# Patient Record
Sex: FEMALE | Race: WHITE | NOT HISPANIC OR LATINO | Employment: PART TIME | URBAN - METROPOLITAN AREA
[De-identification: names, ages, dates, MRNs, and addresses within clinical notes are randomized per-mention and may not be internally consistent; named-entity substitution may affect disease eponyms.]

---

## 2023-08-28 ENCOUNTER — HOSPITAL ENCOUNTER (OUTPATIENT)
Dept: DATA CONVERSION | Facility: HOSPITAL | Age: 53
Discharge: HOME | End: 2023-08-28
Payer: COMMERCIAL

## 2023-08-28 DIAGNOSIS — R53.83 OTHER FATIGUE: ICD-10-CM

## 2023-08-28 DIAGNOSIS — Z00.00 ENCOUNTER FOR GENERAL ADULT MEDICAL EXAMINATION WITHOUT ABNORMAL FINDINGS: ICD-10-CM

## 2023-08-28 LAB
25(OH)D3 SERPL-MCNC: 40 NG/ML (ref 31–100)
ALBUMIN SERPL-MCNC: 4.5 GM/DL (ref 3.5–5)
ALBUMIN/GLOB SERPL: 1.5 RATIO (ref 1.5–3)
ALP BLD-CCNC: 106 U/L (ref 35–125)
ALT SERPL-CCNC: 21 U/L (ref 5–40)
ANION GAP SERPL CALCULATED.3IONS-SCNC: 13 MMOL/L (ref 0–19)
APPEARANCE PLAS: CLEAR
AST SERPL-CCNC: 24 U/L (ref 5–40)
BILIRUB SERPL-MCNC: 0.5 MG/DL (ref 0.1–1.2)
BUN SERPL-MCNC: 19 MG/DL (ref 8–25)
BUN/CREAT SERPL: 17.3 RATIO (ref 8–21)
CALCIUM SERPL-MCNC: 9.7 MG/DL (ref 8.5–10.4)
CHLORIDE SERPL-SCNC: 105 MMOL/L (ref 97–107)
CHOLEST SERPL-MCNC: 155 MG/DL (ref 133–200)
CHOLEST/HDLC SERPL: 3.3 RATIO
CO2 SERPL-SCNC: 23 MMOL/L (ref 24–31)
COLOR SPUN FLD: YELLOW
CREAT SERPL-MCNC: 1.1 MG/DL (ref 0.4–1.6)
DEPRECATED RDW RBC AUTO: 40.5 FL (ref 37–54)
ERYTHROCYTE [DISTWIDTH] IN BLOOD BY AUTOMATED COUNT: 12.3 % (ref 11.7–15)
FASTING STATUS PATIENT QL REPORTED: ABNORMAL
GFR SERPL CREATININE-BSD FRML MDRD: 59 ML/MIN/1.73 M2
GLOBULIN SER-MCNC: 3.1 G/DL (ref 1.9–3.7)
GLUCOSE SERPL-MCNC: 95 MG/DL (ref 65–99)
HCT VFR BLD AUTO: 41.9 % (ref 36–44)
HDLC SERPL-MCNC: 47 MG/DL
HGB BLD-MCNC: 13.7 GM/DL (ref 12–15)
IRON SATN MFR SERPL: 39 % (ref 12–50)
IRON SERPL-MCNC: 110 UG/DL (ref 30–160)
LDLC SERPL CALC-MCNC: 86 MG/DL (ref 65–130)
MCH RBC QN AUTO: 29.1 PG (ref 26–34)
MCHC RBC AUTO-ENTMCNC: 32.7 % (ref 31–37)
MCV RBC AUTO: 89 FL (ref 80–100)
NRBC BLD-RTO: 0 /100 WBC
PLATELET # BLD AUTO: 248 K/UL (ref 150–450)
PMV BLD AUTO: 11 CU (ref 7–12.6)
POTASSIUM SERPL-SCNC: 4.4 MMOL/L (ref 3.4–5.1)
PROT SERPL-MCNC: 7.6 G/DL (ref 5.9–7.9)
RBC # BLD AUTO: 4.71 M/UL (ref 4–4.9)
SODIUM SERPL-SCNC: 141 MMOL/L (ref 133–145)
TIBC SERPL-MCNC: 282 UG/DL (ref 228–428)
TRIGL SERPL-MCNC: 109 MG/DL (ref 40–150)
TSH SERPL DL<=0.05 MIU/L-ACNC: 1.75 MIU/L (ref 0.27–4.2)
VIT B12 SERPL-MCNC: 480 PG/ML (ref 211–946)
WBC # BLD AUTO: 7 K/UL (ref 4.5–11)

## 2023-09-15 ENCOUNTER — HOSPITAL ENCOUNTER (OUTPATIENT)
Dept: DATA CONVERSION | Facility: HOSPITAL | Age: 53
End: 2023-09-15

## 2023-09-15 VITALS
BODY MASS INDEX: 33.66 KG/M2 | WEIGHT: 202 LBS | HEART RATE: 80 BPM | DIASTOLIC BLOOD PRESSURE: 72 MMHG | SYSTOLIC BLOOD PRESSURE: 116 MMHG | OXYGEN SATURATION: 96 % | RESPIRATION RATE: 18 BRPM | HEIGHT: 65 IN | TEMPERATURE: 96.2 F

## 2023-09-15 DIAGNOSIS — R91.1 SOLITARY PULMONARY NODULE: ICD-10-CM

## 2023-09-15 DIAGNOSIS — I77.810 THORACIC AORTIC ECTASIA (CMS-HCC): ICD-10-CM

## 2023-11-13 ENCOUNTER — LAB (OUTPATIENT)
Dept: LAB | Facility: LAB | Age: 53
End: 2023-11-13
Payer: COMMERCIAL

## 2023-11-13 ENCOUNTER — TELEMEDICINE (OUTPATIENT)
Dept: PRIMARY CARE | Facility: CLINIC | Age: 53
End: 2023-11-13
Payer: COMMERCIAL

## 2023-11-13 DIAGNOSIS — R10.10 UPPER ABDOMINAL PAIN: ICD-10-CM

## 2023-11-13 DIAGNOSIS — R10.10 UPPER ABDOMINAL PAIN: Primary | ICD-10-CM

## 2023-11-13 DIAGNOSIS — K21.9 CHRONIC GERD: ICD-10-CM

## 2023-11-13 PROBLEM — L30.9 DERMATITIS, UNSPECIFIED: Status: ACTIVE | Noted: 2017-10-07

## 2023-11-13 PROBLEM — N76.1 CHRONIC VAGINITIS: Status: ACTIVE | Noted: 2023-11-13

## 2023-11-13 PROBLEM — L28.0 LICHEN SIMPLEX CHRONICUS: Status: ACTIVE | Noted: 2019-01-01

## 2023-11-13 PROBLEM — I77.810 ASCENDING AORTA DILATATION (CMS-HCC): Status: ACTIVE | Noted: 2023-11-13

## 2023-11-13 PROBLEM — F41.9 ANXIETY DISORDER: Status: ACTIVE | Noted: 2023-11-13

## 2023-11-13 PROBLEM — E78.5 HYPERLIPIDEMIA: Status: ACTIVE | Noted: 2018-08-31

## 2023-11-13 PROBLEM — L23.9 ALLERGIC ECZEMA: Status: ACTIVE | Noted: 2023-11-13

## 2023-11-13 LAB
ALBUMIN SERPL-MCNC: 4.7 G/DL (ref 3.5–5)
ALP BLD-CCNC: 101 U/L (ref 35–125)
ALT SERPL-CCNC: 28 U/L (ref 5–40)
ANION GAP SERPL CALC-SCNC: 12 MMOL/L
AST SERPL-CCNC: 28 U/L (ref 5–40)
BILIRUB SERPL-MCNC: 0.3 MG/DL (ref 0.1–1.2)
BUN SERPL-MCNC: 12 MG/DL (ref 8–25)
CALCIUM SERPL-MCNC: 9.6 MG/DL (ref 8.5–10.4)
CHLORIDE SERPL-SCNC: 102 MMOL/L (ref 97–107)
CO2 SERPL-SCNC: 27 MMOL/L (ref 24–31)
CREAT SERPL-MCNC: 1 MG/DL (ref 0.4–1.6)
ERYTHROCYTE [DISTWIDTH] IN BLOOD BY AUTOMATED COUNT: 12.5 % (ref 11.5–14.5)
GFR SERPL CREATININE-BSD FRML MDRD: 67 ML/MIN/1.73M*2
GLUCOSE SERPL-MCNC: 98 MG/DL (ref 65–99)
HCT VFR BLD AUTO: 42.5 % (ref 36–46)
HGB BLD-MCNC: 13.9 G/DL (ref 12–16)
LIPASE SERPL-CCNC: 32 U/L (ref 16–63)
MCH RBC QN AUTO: 29.1 PG (ref 26–34)
MCHC RBC AUTO-ENTMCNC: 32.7 G/DL (ref 32–36)
MCV RBC AUTO: 89 FL (ref 80–100)
NRBC BLD-RTO: 0 /100 WBCS (ref 0–0)
PLATELET # BLD AUTO: 246 X10*3/UL (ref 150–450)
POTASSIUM SERPL-SCNC: 4.2 MMOL/L (ref 3.4–5.1)
PROT SERPL-MCNC: 7.3 G/DL (ref 5.9–7.9)
RBC # BLD AUTO: 4.77 X10*6/UL (ref 4–5.2)
SODIUM SERPL-SCNC: 141 MMOL/L (ref 133–145)
WBC # BLD AUTO: 7.1 X10*3/UL (ref 4.4–11.3)

## 2023-11-13 PROCEDURE — 85027 COMPLETE CBC AUTOMATED: CPT

## 2023-11-13 PROCEDURE — 36415 COLL VENOUS BLD VENIPUNCTURE: CPT

## 2023-11-13 PROCEDURE — 99214 OFFICE O/P EST MOD 30 MIN: CPT | Performed by: FAMILY MEDICINE

## 2023-11-13 PROCEDURE — 80053 COMPREHEN METABOLIC PANEL: CPT

## 2023-11-13 PROCEDURE — 83690 ASSAY OF LIPASE: CPT

## 2023-11-13 RX ORDER — OMEPRAZOLE 40 MG/1
40 CAPSULE, DELAYED RELEASE ORAL
Qty: 30 CAPSULE | Refills: 11 | Status: SHIPPED | OUTPATIENT
Start: 2023-11-13 | End: 2024-04-01 | Stop reason: WASHOUT

## 2023-11-13 RX ORDER — DIAZEPAM 2 MG/1
TABLET ORAL
COMMUNITY
Start: 2022-08-22

## 2023-11-13 RX ORDER — ALBUTEROL SULFATE 90 UG/1
AEROSOL, METERED RESPIRATORY (INHALATION)
COMMUNITY

## 2023-11-13 ASSESSMENT — PAIN SCALES - GENERAL: PAINLEVEL: 7

## 2023-11-13 NOTE — PROGRESS NOTES
With patient's permission this is a telemedicine visit with video and audio.  Doris Zhao is a 53 y.o. female who calls in for No chief complaint on file..  Last few weeks having abdominal pain. Upper middle. Worse after eating. Sometimes radiates to left side. Not eating currently to reduce pain. Bowel movements are consistent, no constipation or diarrhea.  No blood in stool.  No alcohol use.  No NSAID use.  Last upper endoscopy and colonoscopy was in 2015.        Objective   There were no vitals taken for this visit.    (Any vitals are reported by patient.)    On video:     Appearance: Normal appearance. Non acute appearing. Walking around, pushing on own stomach.      Effort: Respiratory effort is normal. Speaking in full sentences. No respiratory distress.     Mood and Affect: Mood normal.         Thought Content: Thought content normal.         Judgment: Judgment normal.       Assessment/Plan   Diagnoses and all orders for this visit:  Upper abdominal pain  -     US right upper quadrant; Future  -     Referral to Gastroenterology; Future  -     Comprehensive metabolic panel; Future  -     Lipase; Future  -     CBC; Future  Chronic GERD  -     omeprazole (PriLOSEC) 40 mg DR capsule; Take 1 capsule (40 mg) by mouth once daily in the morning. Take before meals. Do not crush or chew.    Precautions for worsening signs and symptoms to seek emergent care given.

## 2023-11-15 ENCOUNTER — HOSPITAL ENCOUNTER (OUTPATIENT)
Dept: RADIOLOGY | Facility: CLINIC | Age: 53
Discharge: HOME | End: 2023-11-15
Payer: COMMERCIAL

## 2023-11-15 DIAGNOSIS — R10.10 UPPER ABDOMINAL PAIN: ICD-10-CM

## 2023-11-15 PROCEDURE — 76705 ECHO EXAM OF ABDOMEN: CPT

## 2023-11-15 PROCEDURE — 76705 ECHO EXAM OF ABDOMEN: CPT | Performed by: RADIOLOGY

## 2023-11-17 ENCOUNTER — TELEPHONE (OUTPATIENT)
Dept: PRIMARY CARE | Facility: CLINIC | Age: 53
End: 2023-11-17
Payer: COMMERCIAL

## 2023-11-20 NOTE — TELEPHONE ENCOUNTER
Pt states that she sees GI tomorrow and that she was in the ER. States that they found something . Please advise

## 2023-11-20 NOTE — TELEPHONE ENCOUNTER
Pt made aware via vm and instructed to give the office an call back if further assistance is needed.

## 2023-11-20 NOTE — TELEPHONE ENCOUNTER
US unclear if gallbladder is the problem causing her pain. She should see GI for further evaluation though

## 2023-11-30 ENCOUNTER — TELEMEDICINE (OUTPATIENT)
Dept: PRIMARY CARE | Facility: CLINIC | Age: 53
End: 2023-11-30
Payer: COMMERCIAL

## 2023-11-30 DIAGNOSIS — B34.9 VIRAL SYNDROME: Primary | ICD-10-CM

## 2023-11-30 PROCEDURE — 99213 OFFICE O/P EST LOW 20 MIN: CPT | Performed by: NURSE PRACTITIONER

## 2023-11-30 RX ORDER — SUCRALFATE 1 G/10ML
1 SUSPENSION ORAL
COMMUNITY
Start: 2023-11-18 | End: 2023-12-29 | Stop reason: WASHOUT

## 2023-11-30 ASSESSMENT — ENCOUNTER SYMPTOMS
HEMOPTYSIS: 0
WEIGHT LOSS: 0
SHORTNESS OF BREATH: 0
RHINORRHEA: 0
MYALGIAS: 0
FEVER: 0
HEADACHES: 0
HEARTBURN: 0
WHEEZING: 0
SORE THROAT: 0
CHILLS: 0
SWEATS: 0
COUGH: 1

## 2023-11-30 ASSESSMENT — PAIN SCALES - GENERAL: PAINLEVEL: 8

## 2023-11-30 ASSESSMENT — PATIENT HEALTH QUESTIONNAIRE - PHQ9
2. FEELING DOWN, DEPRESSED OR HOPELESS: NOT AT ALL
SUM OF ALL RESPONSES TO PHQ9 QUESTIONS 1 AND 2: 0
1. LITTLE INTEREST OR PLEASURE IN DOING THINGS: NOT AT ALL

## 2023-11-30 NOTE — PATIENT INSTRUCTIONS
Because of the length of your illness, I have determined you have a viral infection known as a common cold, flu, or COVID  You can use Advil cold and sinus, Tylenol cold and sinus, or Sudafed 12 hour (all must be purchased from the pharmacist)  If you have high blood pressure use Coricidin HBP  Vicks vapor rub  Humidifier at night  Saline nasal spray for stuffy nose  Cepacol spray or lozenges for sore throat  If you are still sick after ten days call for an antibiotic.

## 2023-11-30 NOTE — PROGRESS NOTES
With patient's permission this is a telemedicine visit with video and audio.  History Of Present Illness  Doris Zhao is a 53 y.o. female who calls in for Cough (DR VARGAS PT- covid test negative sx are cough + sore throat+ earache + eyes are crusted and red x 5 days-[phone]/Tried tylenol, cough drops).    Cough  This is a new problem. The current episode started in the past 7 days. The problem has been unchanged. The problem occurs every few minutes. The cough is Non-productive. Associated symptoms include ear pain. Pertinent negatives include no chest pain, chills, ear congestion, fever, headaches, heartburn, hemoptysis, myalgias, nasal congestion, postnasal drip, rash, rhinorrhea, sore throat, shortness of breath, sweats, weight loss or wheezing.   Earache   There is pain in both ears. This is a new problem. The current episode started in the past 7 days. The pain is at a severity of 8/10. Associated symptoms include coughing. Pertinent negatives include no headaches, rash, rhinorrhea or sore throat.       Past Medical History  She has a past medical history of Anxiety disorder, unspecified, Encounter for insertion of intrauterine contraceptive device (2017), Personal history of other diseases of the digestive system, Personal history of other diseases of the nervous system and sense organs, and Personal history of other diseases of the respiratory system.    Medications  Current Outpatient Medications   Medication Instructions    albuterol 90 mcg/actuation inhaler INHALE 2 PUFFS BY MOUTH EVERY 4 HOURS AS NEEDED FOR 30 DAYS.    diazePAM (Valium) 2 mg tablet 1 tablet as needed Orally Once a day PRN anxiety for 30 days    omeprazole (PRILOSEC) 40 mg, oral, Daily before breakfast, Do not crush or chew.    sucralfate (CARAFATE) 1 g, oral, Daily with evening meal, Before meal        Surgical History  She has a past surgical history that includes  section, classic (10/24/2016) and Sinus surgery  (10/24/2016).     Social History  She reports that she has never smoked. She has never used smokeless tobacco. No history on file for alcohol use and drug use.    Family History  No family history on file.     Allergies  Codeine, Balsam peru, Ethylene, Glyceryl-t, Other, Potassium dichromate, Ethylene oxide (gas), Guaifenesin, and Propylene glycol    On video:     Appearance: Normal appearance.      Effort: Respiratory effort is normal. Speaking in full sentences. No respiratory distress.     Mood and Affect: Mood normal.         Thought Content: Thought content normal.         Judgment: Judgment normal.      Last Recorded Vitals  There were no vitals taken for this visit.  (Vitals are taken by patient at home, if any reported)    Relevant Results      Assessment/Plan   There are no diagnoses linked to this encounter.        Elida Camarillo, APRN-CNP

## 2023-12-28 ENCOUNTER — TELEPHONE (OUTPATIENT)
Dept: PRIMARY CARE | Facility: CLINIC | Age: 53
End: 2023-12-28
Payer: COMMERCIAL

## 2023-12-29 ENCOUNTER — TELEMEDICINE (OUTPATIENT)
Dept: PRIMARY CARE | Facility: CLINIC | Age: 53
End: 2023-12-29
Payer: COMMERCIAL

## 2023-12-29 VITALS — HEART RATE: 101 BPM | OXYGEN SATURATION: 96 %

## 2023-12-29 DIAGNOSIS — J40 BRONCHITIS: Primary | ICD-10-CM

## 2023-12-29 DIAGNOSIS — B37.9 YEAST INFECTION: ICD-10-CM

## 2023-12-29 DIAGNOSIS — R91.1 LUNG NODULE: ICD-10-CM

## 2023-12-29 PROCEDURE — 99214 OFFICE O/P EST MOD 30 MIN: CPT | Performed by: FAMILY MEDICINE

## 2023-12-29 RX ORDER — AZITHROMYCIN 250 MG/1
TABLET, FILM COATED ORAL
Qty: 6 TABLET | Refills: 0 | Status: SHIPPED | OUTPATIENT
Start: 2023-12-29 | End: 2024-01-03

## 2023-12-29 RX ORDER — METHYLPREDNISOLONE 4 MG/1
TABLET ORAL
Qty: 21 TABLET | Refills: 0 | Status: SHIPPED | OUTPATIENT
Start: 2023-12-29 | End: 2024-01-05

## 2023-12-29 RX ORDER — FLUCONAZOLE 150 MG/1
150 TABLET ORAL ONCE
Qty: 1 TABLET | Refills: 0 | Status: SHIPPED | OUTPATIENT
Start: 2023-12-29 | End: 2023-12-29

## 2023-12-29 ASSESSMENT — PAIN SCALES - GENERAL: PAINLEVEL: 3

## 2023-12-29 ASSESSMENT — PATIENT HEALTH QUESTIONNAIRE - PHQ9
SUM OF ALL RESPONSES TO PHQ9 QUESTIONS 1 AND 2: 0
1. LITTLE INTEREST OR PLEASURE IN DOING THINGS: NOT AT ALL
2. FEELING DOWN, DEPRESSED OR HOPELESS: NOT AT ALL

## 2023-12-29 NOTE — ASSESSMENT & PLAN NOTE
- Patient subsequently contacted office after encounter claiming to have yeast infection concerns relating to antibiotic use to which Diflucan was sent to pharmacy

## 2023-12-29 NOTE — PROGRESS NOTES
Outpatient Visit Note    Chief Complaint   Patient presents with    Chest Pain     Dr Berry pt - Chest congestion. Has been to  twice in the last month. Pt states when up and moving makes it worse. Pt had COVID a few weeks ago. Took at home COVID test with neg results    Cough     X2 months. Productive cough.    Shortness of Breath    Headache     intermittent       With patient's permission, this is a Telemedicine visit with video and audio. The provider and patient participated in this telemedicine encounter.    HPI:  Doris Zhao is a 53 y.o. female who presents to the office via telemedicine encounter secondary to complaints of upper respiratory symptoms.    Patient is established with Dr. Berry having last been seen in the office via telemedicine encounter on 11/30/2023 by Kiki Camarillo secondary to complaints of cough with sore throat, ear pain and eye irritation for approximately 5-7 days.  Reported cough to be nonproductive with prominent bilateral ear discomfort rated as a 8/10.  At that time symptoms were believed to be viral in nature with patient recommended supportive care measures.      Today she reports persistent productive cough which has been present for approximately 2 months.  Did have COVID several weeks ago with recent home COVID taken which was negative.  Was seen at urgent care prior to COVID, at which time she was placed on azithromycin noticing resolution of our respiratory symptoms for several days prior to COVID infection.  At most recent urgent care visit, she was prescribed Augmentin but was unable to tolerate medication as she has difficulty swallowing pills.  Has had intermittent headaches and shortness of breath.     Per patient she states that she has been evaluated at urgent care 2 separate times over the course the last month.  In contacting office yesterday she reported that recent chest x-ray identified left lower lobe 4 mm nodule.  Chart review shows that patient had  previous chest CT completed approximately 1 year ago by Dr. Puga of pulmonary medicine which identified similar left lower lobe nodule.          Current Medications  Current Outpatient Medications   Medication Instructions    albuterol 90 mcg/actuation inhaler INHALE 2 PUFFS BY MOUTH EVERY 4 HOURS AS NEEDED FOR 30 DAYS.    azithromycin (Zithromax) 250 mg tablet Take 2 tablets (500 mg) by mouth once daily for 1 day, THEN 1 tablet (250 mg) once daily for 4 days. Take 2 tabs (500 mg) by mouth today, than 1 daily for 4 days..    diazePAM (Valium) 2 mg tablet 1 tablet as needed Orally Once a day PRN anxiety for 30 days    fluconazole (DIFLUCAN) 150 mg, oral, Once, May take second dose after 72 hours if symptoms persist    methylPREDNISolone (Medrol Dospak) 4 mg tablets Take as directed on package.    omeprazole (PRILOSEC) 40 mg, oral, Daily before breakfast, Do not crush or chew.        Allergies  Allergies   Allergen Reactions    Codeine GI Upset    Balsam [place] Itching    Ethylene Itching    Glyceryl-T Hives and Itching    Other Hives and Itching    Potassium Dichromate Hives and Itching    Ethylene Oxide (Gas) Rash    Guaifenesin Itching    Propylene Glycol Hives, Itching, Other and Rash        Past Medical History:   Diagnosis Date    Anxiety     Anxiety disorder, unspecified     Anxiety    Asthma     Encounter for insertion of intrauterine contraceptive device 2017    Encounter for insertion of mirena IUD    Personal history of other diseases of the digestive system     History of esophageal reflux    Personal history of other diseases of the nervous system and sense organs     History of migraine with aura    Personal history of other diseases of the respiratory system     History of asthma      Past Surgical History:   Procedure Laterality Date     SECTION, CLASSIC  10/24/2016     Section     SECTION, LOW TRANSVERSE      SINUS SURGERY  10/24/2016    Sinus Surgery     No family  history on file.  Social History     Tobacco Use    Smoking status: Never    Smokeless tobacco: Never   Vaping Use    Vaping Use: Never used   Substance Use Topics    Alcohol use: Not Currently    Drug use: Never     Tobacco Use: Low Risk  (12/29/2023)    Patient History     Smoking Tobacco Use: Never     Smokeless Tobacco Use: Never     Passive Exposure: Not on file        ROS  All pertinent positive symptoms are included in the history of present illness.  All other systems have been reviewed and are negative and noncontributory to this patient's current ailments.    VITAL SIGNS  Patient is unable to provide    PHYSICAL EXAM  GENERAL APPEARANCE:  Alert and oriented x 3, Pleasant and cooperative, No acute distress.   LUNGS:  No conversational dyspnea or cough during encounter.   PSYCH:  appropriate mood and affect, no difficulty with speech.   Telemedicine visit, no other exam component done.      Assessment/Plan   Problem List Items Addressed This Visit             ICD-10-CM    Lung nodule R91.1     - Recently identified lung nodule seems to be in consistent location of the previously identified lung nodule as noted on CT scan completed in December 2022 by Dr. Puga  -Following resolution of acute symptoms, would recommend patient have routine follow-up with established PCP/pulmonary specialist for monitoring of send nodule         Bronchitis - Primary J40     - Given your symptoms and duration of illness, we feel that you can benefit from antibiotic coverage at this time   - A prescription for azithromycin was sent to your pharmacy, please take this medication as prescribed  -We will additionally send Medrol Dosepak with hopes that this reduces respiratory tract irritation and opens up lungs  -Tessalon Perles offered though declined as patient has had poor response in the past  - Recommend supportive care with increased fluid intake to thin secretions, Tylenol as needed for pain or fever, and steamy  showers/saline nasal rinses to help clear the nasal passages   - You may consider tea with honey or a cinnamon stick as these have natural antiviral and antibiotic properties   - Call if symptoms worsen or do not improve with these treatments         Relevant Medications    azithromycin (Zithromax) 250 mg tablet    methylPREDNISolone (Medrol Dospak) 4 mg tablets    Yeast infection B37.9     - Patient subsequently contacted office after encounter claiming to have yeast infection concerns relating to antibiotic use to which Diflucan was sent to pharmacy         Relevant Medications    fluconazole (Diflucan) 150 mg tablet

## 2023-12-29 NOTE — ASSESSMENT & PLAN NOTE
- Given your symptoms and duration of illness, we feel that you can benefit from antibiotic coverage at this time   - A prescription for azithromycin was sent to your pharmacy, please take this medication as prescribed  -We will additionally send Medrol Dosepak with hopes that this reduces respiratory tract irritation and opens up lungs  -Tessalon Perles offered though declined as patient has had poor response in the past  - Recommend supportive care with increased fluid intake to thin secretions, Tylenol as needed for pain or fever, and steamy showers/saline nasal rinses to help clear the nasal passages   - You may consider tea with honey or a cinnamon stick as these have natural antiviral and antibiotic properties   - Call if symptoms worsen or do not improve with these treatments

## 2023-12-29 NOTE — PATIENT INSTRUCTIONS
Problem List Items Addressed This Visit             ICD-10-CM    Lung nodule R91.1     - Recently identified lung nodule seems to be in consistent location of the previously identified lung nodule as noted on CT scan completed in December 2022 by Dr. Puga  -Following resolution of acute symptoms, would recommend patient have routine follow-up with established PCP/pulmonary specialist for monitoring of send nodule         Bronchitis - Primary J40     - Given your symptoms and duration of illness, we feel that you can benefit from antibiotic coverage at this time   - A prescription for azithromycin was sent to your pharmacy, please take this medication as prescribed  -We will additionally send Medrol Dosepak with hopes that this reduces respiratory tract irritation and opens up lungs  -Tessalon Perles offered though declined as patient has had poor response in the past  - Recommend supportive care with increased fluid intake to thin secretions, Tylenol as needed for pain or fever, and steamy showers/saline nasal rinses to help clear the nasal passages   - You may consider tea with honey or a cinnamon stick as these have natural antiviral and antibiotic properties   - Call if symptoms worsen or do not improve with these treatments         Relevant Medications    azithromycin (Zithromax) 250 mg tablet    methylPREDNISolone (Medrol Dospak) 4 mg tablets

## 2023-12-29 NOTE — ASSESSMENT & PLAN NOTE
- Recently identified lung nodule seems to be in consistent location of the previously identified lung nodule as noted on CT scan completed in December 2022 by Dr. Puga  -Following resolution of acute symptoms, would recommend patient have routine follow-up with established PCP/pulmonary specialist for monitoring of send nodule

## 2024-01-12 ENCOUNTER — HOSPITAL ENCOUNTER (OUTPATIENT)
Dept: RADIOLOGY | Facility: HOSPITAL | Age: 54
Discharge: HOME | End: 2024-01-12
Payer: COMMERCIAL

## 2024-01-12 DIAGNOSIS — I77.810 THORACIC AORTIC ECTASIA (CMS-HCC): ICD-10-CM

## 2024-01-12 PROCEDURE — 2550000001 HC RX 255 CONTRASTS: Performed by: FAMILY MEDICINE

## 2024-01-12 PROCEDURE — 71260 CT THORAX DX C+: CPT

## 2024-01-12 RX ADMIN — IOHEXOL 75 ML: 350 INJECTION, SOLUTION INTRAVENOUS at 08:38

## 2024-03-08 DIAGNOSIS — F41.9 ANXIETY DISORDER, UNSPECIFIED: ICD-10-CM

## 2024-03-11 RX ORDER — DIAZEPAM 2 MG/1
TABLET ORAL
Qty: 30 TABLET | Refills: 2 | OUTPATIENT
Start: 2024-03-11

## 2024-03-14 NOTE — TELEPHONE ENCOUNTER
SPOKE WITH PT AT THIS TIME SHE IS NOT ABLE TO SCHEDULE APT. PT STATES HER REFILLS , AND SHE JUST WANTED A NEW SCRIPT FOR EMERGENCIES, SHE STILL HAS MEDICATION LEFT, ONLY TAKING PRN.

## 2024-03-25 ENCOUNTER — HOSPITAL ENCOUNTER (OUTPATIENT)
Dept: RADIOLOGY | Facility: HOSPITAL | Age: 54
Discharge: HOME | End: 2024-03-25
Payer: COMMERCIAL

## 2024-03-25 VITALS — BODY MASS INDEX: 31.32 KG/M2 | HEIGHT: 65 IN | WEIGHT: 188 LBS

## 2024-03-25 DIAGNOSIS — Z12.31 ENCOUNTER FOR SCREENING MAMMOGRAM FOR MALIGNANT NEOPLASM OF BREAST: ICD-10-CM

## 2024-03-25 PROCEDURE — 77063 BREAST TOMOSYNTHESIS BI: CPT | Performed by: RADIOLOGY

## 2024-03-25 PROCEDURE — 77067 SCR MAMMO BI INCL CAD: CPT | Performed by: RADIOLOGY

## 2024-03-25 PROCEDURE — 77067 SCR MAMMO BI INCL CAD: CPT

## 2024-04-01 ENCOUNTER — OFFICE VISIT (OUTPATIENT)
Dept: PRIMARY CARE | Facility: CLINIC | Age: 54
End: 2024-04-01
Payer: COMMERCIAL

## 2024-04-01 VITALS
WEIGHT: 190.6 LBS | SYSTOLIC BLOOD PRESSURE: 118 MMHG | OXYGEN SATURATION: 96 % | HEIGHT: 65 IN | HEART RATE: 90 BPM | BODY MASS INDEX: 31.75 KG/M2 | TEMPERATURE: 97.4 F | DIASTOLIC BLOOD PRESSURE: 76 MMHG | RESPIRATION RATE: 18 BRPM

## 2024-04-01 DIAGNOSIS — R10.9 LEFT SIDED ABDOMINAL PAIN: ICD-10-CM

## 2024-04-01 DIAGNOSIS — M54.9 MID BACK PAIN ON LEFT SIDE: Primary | ICD-10-CM

## 2024-04-01 PROCEDURE — 1036F TOBACCO NON-USER: CPT | Performed by: FAMILY MEDICINE

## 2024-04-01 PROCEDURE — 99214 OFFICE O/P EST MOD 30 MIN: CPT | Performed by: FAMILY MEDICINE

## 2024-04-01 RX ORDER — GABAPENTIN 100 MG/1
100 CAPSULE ORAL 3 TIMES DAILY PRN
Qty: 60 CAPSULE | Refills: 0 | Status: SHIPPED | OUTPATIENT
Start: 2024-04-01 | End: 2024-09-28

## 2024-04-01 RX ORDER — CYCLOBENZAPRINE HCL 5 MG
2.5 TABLET ORAL 3 TIMES DAILY PRN
Qty: 30 TABLET | Refills: 0 | Status: SHIPPED | OUTPATIENT
Start: 2024-04-01

## 2024-04-01 RX ORDER — SUCRALFATE 1 G/10ML
1 SUSPENSION ORAL 2 TIMES DAILY
COMMUNITY
Start: 2024-03-29

## 2024-04-01 ASSESSMENT — ENCOUNTER SYMPTOMS
DYSURIA: 0
NAUSEA: 1
FEVER: 0
ABDOMINAL PAIN: 1
BELCHING: 1
CONSTIPATION: 1

## 2024-04-01 ASSESSMENT — PAIN SCALES - GENERAL: PAINLEVEL: 7

## 2024-04-01 NOTE — PROGRESS NOTES
History Of Present Illness  Doris Zhao is a 53 y.o. female presenting for Pain  .    HPI Has had chronic abdominal pain, left sided, intermittent, crampy. Radiates underneath left rib to left mid back. Laying on left side makes her hurt, but not on right side. Heat makes it feel better. Food doesn't make it feel worse. Having a BM does not make pain feel better. Currently undergoing evaluation with GI, Dr. Mathews. Awaiting results from breath testing. Has appt in 2 weeks. Omeprazole caused her to have pain.  CT 2023 nonacute.         Past Medical History  Patient Active Problem List    Diagnosis Date Noted    Lung nodule 2023    Bronchitis 2023    Yeast infection 2023    Allergic eczema 2023    Anxiety disorder 2023    Ascending aorta dilatation (CMS/HCC) 2023    Chronic GERD 2023    Chronic vaginitis 2023    Lichen simplex chronicus 2019    Hyperlipidemia 2018    Dermatitis, unspecified 10/07/2017        Medications  Current Outpatient Medications on File Prior to Visit   Medication Sig    albuterol 90 mcg/actuation inhaler INHALE 2 PUFFS BY MOUTH EVERY 4 HOURS AS NEEDED FOR 30 DAYS.    diazePAM (Valium) 2 mg tablet 1 tablet as needed Orally Once a day PRN anxiety for 30 days    sucralfate (Carafate) 100 mg/mL suspension Take 10 mL (1 g) by mouth 2 times a day.    [DISCONTINUED] omeprazole (PriLOSEC) 40 mg DR capsule Take 1 capsule (40 mg) by mouth once daily in the morning. Take before meals. Do not crush or chew.     No current facility-administered medications on file prior to visit.        Surgical History  She has a past surgical history that includes  section, classic (10/24/2016); Sinus surgery (10/24/2016); and  section, low transverse.     Social History  She reports that she has never smoked. She has never used smokeless tobacco. She reports that she does not currently use alcohol. She reports that she does not use  "drugs.    Family History  Family History   Problem Relation Name Age of Onset    Breast cancer Cousin          Allergies  Codeine, Balsam peru, Ethylene, Glyceryl-t, Other, Potassium dichromate, Ethylene oxide (gas), Guaifenesin, and Propylene glycol    Immunizations    There is no immunization history on file for this patient.     ROS  Negative, except as discussed in HPI     Vitals  /76 (BP Location: Right arm, Patient Position: Sitting, BP Cuff Size: Adult)   Pulse 90   Temp 36.3 °C (97.4 °F)   Resp 18   Ht 1.651 m (5' 5\")   Wt 86.5 kg (190 lb 9.6 oz)   SpO2 96%   BMI 31.72 kg/m²      Physical Exam  Vitals and nursing note reviewed.   Constitutional:       Appearance: She is normal weight.   Abdominal:      Palpations: Abdomen is soft. There is no mass.      Tenderness: There is no abdominal tenderness. There is no right CVA tenderness, left CVA tenderness, guarding or rebound.   Musculoskeletal:      Thoracic back: Tenderness (left mid back) present. No swelling or bony tenderness.   Neurological:      Mental Status: She is alert.         Relevant Results  Lab Results   Component Value Date    WBC 7.1 11/13/2023    WBC 7.0 08/28/2023    HGB 13.9 11/13/2023    HGB 13.7 08/28/2023    HCT 42.5 11/13/2023    HCT 41.9 08/28/2023    MCV 89 11/13/2023    MCV 89.0 08/28/2023     11/13/2023     08/28/2023     Lab Results   Component Value Date     11/13/2023     08/28/2023    K 4.2 11/13/2023    K 4.4 08/28/2023     11/13/2023     08/28/2023    CO2 27 11/13/2023    CO2 23 (L) 08/28/2023    BUN 12 11/13/2023    BUN 19 08/28/2023    CREATININE 1.00 11/13/2023    CREATININE 1.1 08/28/2023    CALCIUM 9.6 11/13/2023    CALCIUM 9.7 08/28/2023    PROT 7.3 11/13/2023    PROT 7.6 08/28/2023    BILITOT 0.3 11/13/2023    BILITOT 0.5 08/28/2023    ALKPHOS 101 11/13/2023    ALKPHOS 106 08/28/2023    ALT 28 11/13/2023    ALT 21 08/28/2023    AST 28 11/13/2023    AST 24 08/28/2023    " "GLUCOSE 98 11/13/2023    GLUCOSE 95 08/28/2023     No results found for: \"HGBA1C\"  Lab Results   Component Value Date    TSH 1.75 08/28/2023      Lab Results   Component Value Date    CHOL 155 08/28/2023    TRIG 109 08/28/2023    HDL 47 (L) 08/28/2023           Assessment/Plan   Doris was seen today for pain.  Diagnoses and all orders for this visit:  Mid back pain on left side (Primary)  Comments:  trial tx for msk cause  Orders:  -     gabapentin (Neurontin) 100 mg capsule; Take 1 capsule (100 mg) by mouth 3 times a day as needed (pain).  -     cyclobenzaprine (Flexeril) 5 mg tablet; Take 0.5 tablets (2.5 mg) by mouth 3 times a day as needed for muscle spasms.  Left sided abdominal pain  Comments:  Follow up with GI as advised       Medications Discontinued During This Encounter   Medication Reason    omeprazole (PriLOSEC) 40 mg DR capsule Med List Cleanup        Counseling:   Medication education:   -Education:  The patient is counseled regarding potential side-effects of any and all new medications  -Understanding:  Patient expressed understanding of information discussed today  -Adherence:  No barriers to adherence identified    Final treatment plan is a result of shared decision making with patient.         Jay Jay Berry MD   "

## 2024-04-29 ENCOUNTER — TELEPHONE (OUTPATIENT)
Dept: PRIMARY CARE | Facility: CLINIC | Age: 54
End: 2024-04-29
Payer: COMMERCIAL

## 2024-04-29 NOTE — TELEPHONE ENCOUNTER
PT CALLING WITH CONCERNS ABOUT CONTINUED WEIGHT LOSS PT STATE SHE HAS LOST MORE WEIGHT SINCE HER PREVIOUS APPT AND WOULD LIKE TO DISCUSS THIS WITH HER OFFERED HER AN APPOINTMENT AND SHE DECLINED

## 2024-05-02 ENCOUNTER — TELEMEDICINE (OUTPATIENT)
Dept: PRIMARY CARE | Facility: CLINIC | Age: 54
End: 2024-05-02
Payer: COMMERCIAL

## 2024-05-02 DIAGNOSIS — G89.29 CHRONIC ABDOMINAL PAIN: Primary | ICD-10-CM

## 2024-05-02 DIAGNOSIS — R10.9 CHRONIC ABDOMINAL PAIN: Primary | ICD-10-CM

## 2024-05-02 PROCEDURE — 1036F TOBACCO NON-USER: CPT | Performed by: FAMILY MEDICINE

## 2024-05-02 PROCEDURE — 99213 OFFICE O/P EST LOW 20 MIN: CPT | Performed by: FAMILY MEDICINE

## 2024-05-02 ASSESSMENT — ENCOUNTER SYMPTOMS
BACK PAIN: 1
WEIGHT LOSS: 1

## 2024-05-02 ASSESSMENT — PAIN SCALES - GENERAL: PAINLEVEL: 7

## 2024-05-02 ASSESSMENT — PATIENT HEALTH QUESTIONNAIRE - PHQ9
SUM OF ALL RESPONSES TO PHQ9 QUESTIONS 1 AND 2: 0
2. FEELING DOWN, DEPRESSED OR HOPELESS: NOT AT ALL
1. LITTLE INTEREST OR PLEASURE IN DOING THINGS: NOT AT ALL

## 2024-05-02 ASSESSMENT — LIFESTYLE VARIABLES: HOW MANY STANDARD DRINKS CONTAINING ALCOHOL DO YOU HAVE ON A TYPICAL DAY: PATIENT DOES NOT DRINK

## 2024-05-02 NOTE — PROGRESS NOTES
With patient's permission this is a telemedicine visit with video and audio.    History Of Present Illness  Doris Zhao is a 54 y.o. female who calls in for Weight Loss (Discuss back/abd pain pt states she's had this pain for an while now and to discuss weight loss./Pt is unable to obtain vitals./).    She is calling about persistent LUQ pain. Currently seeing GI, last visits 4/3/24 and 4/12/24, then had an EGD that was negative per patient. Patient has a follow up appt today  with GI at 4:30pm.   Reviewed the consult notes from those dates. Patient saw the NP and was prescribed dicyclomine, which she did not try. Pt was under impression that the GI doctor she saw before told her not to take anything but Tylenol.    She's concerned the pain is due to her pancreas. Reviewed with patient ER workup that had normal lipase. She also had a CT A/P on 11/18/24 that was nonacute.    She has continued to lose weight since the pain started.         Past Medical History  She has a past medical history of Anxiety, Anxiety disorder, unspecified, Asthma ([facility]), Encounter for insertion of intrauterine contraceptive device (06/05/2017), Personal history of other diseases of the digestive system, Personal history of other diseases of the nervous system and sense organs, and Personal history of other diseases of the respiratory system.    Medications  Current Outpatient Medications on File Prior to Visit   Medication Sig Dispense Refill    albuterol 90 mcg/actuation inhaler INHALE 2 PUFFS BY MOUTH EVERY 4 HOURS AS NEEDED FOR 30 DAYS.      diazePAM (Valium) 2 mg tablet 1 tablet as needed Orally Once a day PRN anxiety for 30 days      sucralfate (Carafate) 100 mg/mL suspension Take 10 mL (1 g) by mouth 2 times a day.      cyclobenzaprine (Flexeril) 5 mg tablet Take 0.5 tablets (2.5 mg) by mouth 3 times a day as needed for muscle spasms. (Patient not taking: Reported on 5/2/2024) 30 tablet 0    gabapentin (Neurontin) 100 mg  capsule Take 1 capsule (100 mg) by mouth 3 times a day as needed (pain). (Patient not taking: Reported on 2024) 60 capsule 0     No current facility-administered medications on file prior to visit.        Surgical History  She has a past surgical history that includes  section, classic (10/24/2016); Sinus surgery (10/24/2016); and  section, low transverse.     Social History  She reports that she has never smoked. She has never used smokeless tobacco. She reports that she does not currently use alcohol. She reports that she does not use drugs.    Family History  Family History   Problem Relation Name Age of Onset    Breast cancer Cousin          Allergies  Codeine, Balsam peru, Ethylene, Glyceryl-t, Other, Potassium dichromate, Ethylene oxide (gas), Guaifenesin, and Propylene glycol    On video:     Appearance: Normal appearance.      Effort: Respiratory effort is normal. Speaking in full sentences. No respiratory distress.     Mood and Affect: Mood normal.         Thought Content: Thought content normal.         Judgment: Judgment normal.      Last Recorded Vitals  There were no vitals taken for this visit.  (Vitals are taken by patient at home, if any reported)    Relevant Results      Assessment/Plan   Doris was seen today for weight loss.  Diagnoses and all orders for this visit:  Chronic abdominal pain (Primary)  Comments:  refractory pain with weight loss, undergoing GI workup. Advise to follow up today as scheduled. ER precautions given          There are no discontinued medications.     Jay Jay Berry MD

## 2024-06-20 ENCOUNTER — TELEMEDICINE (OUTPATIENT)
Dept: PRIMARY CARE | Facility: CLINIC | Age: 54
End: 2024-06-20
Payer: COMMERCIAL

## 2024-06-20 DIAGNOSIS — R10.9 ABDOMINAL PAIN, UNSPECIFIED ABDOMINAL LOCATION: ICD-10-CM

## 2024-06-20 DIAGNOSIS — R63.4 UNINTENTIONAL WEIGHT LOSS: Primary | ICD-10-CM

## 2024-06-20 PROCEDURE — 99214 OFFICE O/P EST MOD 30 MIN: CPT | Performed by: FAMILY MEDICINE

## 2024-06-20 RX ORDER — LANSOPRAZOLE 30 MG/1
30 CAPSULE, DELAYED RELEASE ORAL DAILY
COMMUNITY
Start: 2024-05-21

## 2024-06-20 RX ORDER — TRIAMCINOLONE ACETONIDE 1 MG/G
CREAM TOPICAL 2 TIMES DAILY
Qty: 30 G | Refills: 0 | Status: SHIPPED | OUTPATIENT
Start: 2024-06-20 | End: 2024-06-20 | Stop reason: ENTERED-IN-ERROR

## 2024-06-20 ASSESSMENT — LIFESTYLE VARIABLES: HOW MANY STANDARD DRINKS CONTAINING ALCOHOL DO YOU HAVE ON A TYPICAL DAY: PATIENT DOES NOT DRINK

## 2024-06-20 ASSESSMENT — PAIN SCALES - GENERAL: PAINLEVEL: 6

## 2024-06-20 ASSESSMENT — PATIENT HEALTH QUESTIONNAIRE - PHQ9
1. LITTLE INTEREST OR PLEASURE IN DOING THINGS: NOT AT ALL
2. FEELING DOWN, DEPRESSED OR HOPELESS: NOT AT ALL
SUM OF ALL RESPONSES TO PHQ9 QUESTIONS 1 AND 2: 0

## 2024-06-20 NOTE — PROGRESS NOTES
With patient's permission this is a telemedicine visit with video and audio.    History Of Present Illness  Doris Zhao is a 54 y.o. female who calls in for Weight Loss (Weight loss follow up/Pt is unable to obtain vitals at this time.).    HPI  Called complaining of a persistent abdominal pain that is been ongoing for months.  She had an unremarkable right upper quadrant ultrasound on 11/15/2023.  She went to the emergency room shortly after that and had an unremarkable CT of the abdomen pelvis on 2023, notable for hepatic steatosis.  Constant stabbing pain in stomach around back  Weight at home is 170 lbs this morning at home.   She has seen GI for evaluations, had an EGD on 2024    Also requesting cream for skin      Past Medical History  She has a past medical history of Anxiety, Anxiety disorder, unspecified, Asthma ([facility]), Encounter for insertion of intrauterine contraceptive device (2017), Personal history of other diseases of the digestive system, Personal history of other diseases of the nervous system and sense organs, and Personal history of other diseases of the respiratory system.    Medications  Current Outpatient Medications on File Prior to Visit   Medication Sig Dispense Refill    albuterol 90 mcg/actuation inhaler INHALE 2 PUFFS BY MOUTH EVERY 4 HOURS AS NEEDED FOR 30 DAYS.      cyclobenzaprine (Flexeril) 5 mg tablet Take 0.5 tablets (2.5 mg) by mouth 3 times a day as needed for muscle spasms. 30 tablet 0    diazePAM (Valium) 2 mg tablet 1 tablet as needed Orally Once a day PRN anxiety for 30 days      lansoprazole (Prevacid) 30 mg DR capsule Take 1 capsule (30 mg) by mouth once daily.      sucralfate (Carafate) 100 mg/mL suspension Take 10 mL (1 g) by mouth 2 times a day.       No current facility-administered medications on file prior to visit.        Surgical History  She has a past surgical history that includes  section, classic (10/24/2016); Sinus surgery  (10/24/2016); and  section, low transverse.     Social History  She reports that she has never smoked. She has never used smokeless tobacco. She reports that she does not currently use alcohol. She reports that she does not use drugs.    Family History  Family History   Problem Relation Name Age of Onset    Breast cancer Cousin          Allergies  Codeine, Balsam peru, Ethylene, Glyceryl-t, Other, Potassium dichromate, Ethylene oxide (gas), Guaifenesin, and Propylene glycol    On video:     Appearance: Normal appearance.      Effort: Respiratory effort is normal. Speaking in full sentences. No respiratory distress.     Mood and Affect: Mood normal.         Thought Content: Thought content normal.         Judgment: Judgment normal.      Last Recorded Vitals  There were no vitals taken for this visit.  (Vitals are taken by patient at home, if any reported)    Relevant Results      Assessment/Plan   Doris was seen today for weight loss.  Diagnoses and all orders for this visit:  Unintentional weight loss (Primary)  -     Comprehensive Metabolic Panel; Future  -     TSH with reflex to Free T4 if abnormal; Future  -     CBC and Auto Differential; Future  -     Lipase; Future  -     CT abdomen pelvis w and wo IV contrast; Future  Abdominal pain, unspecified abdominal location  -     CT abdomen pelvis w and wo IV contrast; Future  Other orders  -     Discontinue: triamcinolone (Kenalog) 0.1 % cream; Apply topically 2 times a day. Apply to affected area 1-2 times daily as needed.          Medications Discontinued During This Encounter   Medication Reason    gabapentin (Neurontin) 100 mg capsule Med List Cleanup    triamcinolone (Kenalog) 0.1 % cream Entered in Error        Jay Jay Berry MD

## 2024-06-25 ENCOUNTER — LAB (OUTPATIENT)
Dept: LAB | Facility: LAB | Age: 54
End: 2024-06-25
Payer: COMMERCIAL

## 2024-06-25 DIAGNOSIS — R63.4 UNINTENTIONAL WEIGHT LOSS: ICD-10-CM

## 2024-06-25 LAB
ALBUMIN SERPL-MCNC: 4.7 G/DL (ref 3.5–5)
ALP BLD-CCNC: 100 U/L (ref 35–125)
ALT SERPL-CCNC: 28 U/L (ref 5–40)
ANION GAP SERPL CALC-SCNC: 12 MMOL/L
AST SERPL-CCNC: 27 U/L (ref 5–40)
BASOPHILS # BLD AUTO: 0.08 X10*3/UL (ref 0–0.1)
BASOPHILS NFR BLD AUTO: 1.2 %
BILIRUB SERPL-MCNC: 0.5 MG/DL (ref 0.1–1.2)
BUN SERPL-MCNC: 17 MG/DL (ref 8–25)
CALCIUM SERPL-MCNC: 9.8 MG/DL (ref 8.5–10.4)
CHLORIDE SERPL-SCNC: 101 MMOL/L (ref 97–107)
CO2 SERPL-SCNC: 27 MMOL/L (ref 24–31)
CREAT SERPL-MCNC: 1.1 MG/DL (ref 0.4–1.6)
EGFRCR SERPLBLD CKD-EPI 2021: 59 ML/MIN/1.73M*2
EOSINOPHIL # BLD AUTO: 0.09 X10*3/UL (ref 0–0.7)
EOSINOPHIL NFR BLD AUTO: 1.3 %
ERYTHROCYTE [DISTWIDTH] IN BLOOD BY AUTOMATED COUNT: 12.7 % (ref 11.5–14.5)
GLUCOSE SERPL-MCNC: 82 MG/DL (ref 65–99)
HCT VFR BLD AUTO: 42.4 % (ref 36–46)
HGB BLD-MCNC: 13.8 G/DL (ref 12–16)
IMM GRANULOCYTES # BLD AUTO: 0.01 X10*3/UL (ref 0–0.7)
IMM GRANULOCYTES NFR BLD AUTO: 0.1 % (ref 0–0.9)
LIPASE SERPL-CCNC: 37 U/L (ref 16–63)
LYMPHOCYTES # BLD AUTO: 2.65 X10*3/UL (ref 1.2–4.8)
LYMPHOCYTES NFR BLD AUTO: 38.3 %
MCH RBC QN AUTO: 29.1 PG (ref 26–34)
MCHC RBC AUTO-ENTMCNC: 32.5 G/DL (ref 32–36)
MCV RBC AUTO: 90 FL (ref 80–100)
MONOCYTES # BLD AUTO: 0.44 X10*3/UL (ref 0.1–1)
MONOCYTES NFR BLD AUTO: 6.4 %
NEUTROPHILS # BLD AUTO: 3.65 X10*3/UL (ref 1.2–7.7)
NEUTROPHILS NFR BLD AUTO: 52.7 %
NRBC BLD-RTO: 0 /100 WBCS (ref 0–0)
PLATELET # BLD AUTO: 232 X10*3/UL (ref 150–450)
POTASSIUM SERPL-SCNC: 4.6 MMOL/L (ref 3.4–5.1)
PROT SERPL-MCNC: 7.4 G/DL (ref 5.9–7.9)
RBC # BLD AUTO: 4.74 X10*6/UL (ref 4–5.2)
SODIUM SERPL-SCNC: 140 MMOL/L (ref 133–145)
TSH SERPL DL<=0.05 MIU/L-ACNC: 1.33 MIU/L (ref 0.27–4.2)
WBC # BLD AUTO: 6.9 X10*3/UL (ref 4.4–11.3)

## 2024-06-25 PROCEDURE — 84443 ASSAY THYROID STIM HORMONE: CPT

## 2024-06-25 PROCEDURE — 80053 COMPREHEN METABOLIC PANEL: CPT

## 2024-06-25 PROCEDURE — 36415 COLL VENOUS BLD VENIPUNCTURE: CPT

## 2024-06-25 PROCEDURE — 83690 ASSAY OF LIPASE: CPT

## 2024-06-25 PROCEDURE — 85025 COMPLETE CBC W/AUTO DIFF WBC: CPT

## 2024-06-26 ENCOUNTER — APPOINTMENT (OUTPATIENT)
Dept: PRIMARY CARE | Facility: CLINIC | Age: 54
End: 2024-06-26
Payer: COMMERCIAL

## 2024-07-08 ENCOUNTER — APPOINTMENT (OUTPATIENT)
Dept: RADIOLOGY | Facility: HOSPITAL | Age: 54
End: 2024-07-08
Payer: COMMERCIAL

## 2024-07-22 ENCOUNTER — APPOINTMENT (OUTPATIENT)
Dept: RADIOLOGY | Facility: HOSPITAL | Age: 54
End: 2024-07-22
Payer: COMMERCIAL

## 2024-08-19 ENCOUNTER — HOSPITAL ENCOUNTER (OUTPATIENT)
Dept: RADIOLOGY | Facility: HOSPITAL | Age: 54
Discharge: HOME | End: 2024-08-19
Payer: COMMERCIAL

## 2024-08-19 DIAGNOSIS — R63.4 UNINTENTIONAL WEIGHT LOSS: ICD-10-CM

## 2024-08-19 DIAGNOSIS — R10.9 ABDOMINAL PAIN, UNSPECIFIED ABDOMINAL LOCATION: ICD-10-CM

## 2024-08-19 PROCEDURE — 2550000001 HC RX 255 CONTRASTS: Performed by: FAMILY MEDICINE

## 2024-08-19 PROCEDURE — 74177 CT ABD & PELVIS W/CONTRAST: CPT | Performed by: RADIOLOGY

## 2024-08-19 PROCEDURE — 74177 CT ABD & PELVIS W/CONTRAST: CPT

## 2024-11-11 ENCOUNTER — TELEPHONE (OUTPATIENT)
Dept: PRIMARY CARE | Facility: CLINIC | Age: 54
End: 2024-11-11
Payer: COMMERCIAL

## 2024-11-11 DIAGNOSIS — F41.9 ANXIETY DISORDER, UNSPECIFIED TYPE: ICD-10-CM

## 2024-11-11 DIAGNOSIS — J40 BRONCHITIS: Primary | ICD-10-CM

## 2024-11-11 NOTE — TELEPHONE ENCOUNTER
PT CAME IN THINKING SHE HAS AN APT, BUT HER APT IS NOT UNTIL JANUARY. PT ASKED FOR THIS MSG TO BE SENT TO PROVIDER ASKING IF ROUTINE BLOOD WORK CAN BE ORDERED AT THIS TIME. PT IS HAVING LEG PAIN AND WOULD LIKE TO GET LABS DONE TO MAKE SURE NOTHING SERIOUS IS GOING ON.

## 2024-11-15 RX ORDER — DIAZEPAM 2 MG/1
2 TABLET ORAL DAILY PRN
Status: CANCELLED | OUTPATIENT
Start: 2024-11-15

## 2024-11-18 ENCOUNTER — OFFICE VISIT (OUTPATIENT)
Dept: PRIMARY CARE | Facility: CLINIC | Age: 54
End: 2024-11-18
Payer: COMMERCIAL

## 2024-11-18 VITALS
DIASTOLIC BLOOD PRESSURE: 82 MMHG | HEIGHT: 65 IN | OXYGEN SATURATION: 98 % | TEMPERATURE: 96.9 F | BODY MASS INDEX: 29.66 KG/M2 | RESPIRATION RATE: 18 BRPM | HEART RATE: 79 BPM | WEIGHT: 178 LBS | SYSTOLIC BLOOD PRESSURE: 124 MMHG

## 2024-11-18 DIAGNOSIS — M79.604 RIGHT LEG PAIN: Primary | ICD-10-CM

## 2024-11-18 PROCEDURE — 3008F BODY MASS INDEX DOCD: CPT | Performed by: NURSE PRACTITIONER

## 2024-11-18 PROCEDURE — 99214 OFFICE O/P EST MOD 30 MIN: CPT | Performed by: NURSE PRACTITIONER

## 2024-11-18 PROCEDURE — 1036F TOBACCO NON-USER: CPT | Performed by: NURSE PRACTITIONER

## 2024-11-18 RX ORDER — PREDNISONE 20 MG/1
40 TABLET ORAL DAILY
Qty: 10 TABLET | Refills: 0 | Status: SHIPPED | OUTPATIENT
Start: 2024-11-18 | End: 2024-11-23

## 2024-11-18 ASSESSMENT — ENCOUNTER SYMPTOMS
CONSTITUTIONAL NEGATIVE: 1
RESPIRATORY NEGATIVE: 1
MUSCULOSKELETAL NEGATIVE: 1
CARDIOVASCULAR NEGATIVE: 1
GASTROINTESTINAL NEGATIVE: 1
LEG PAIN: 1

## 2024-11-18 ASSESSMENT — PATIENT HEALTH QUESTIONNAIRE - PHQ9
2. FEELING DOWN, DEPRESSED OR HOPELESS: NOT AT ALL
1. LITTLE INTEREST OR PLEASURE IN DOING THINGS: NOT AT ALL
SUM OF ALL RESPONSES TO PHQ9 QUESTIONS 1 AND 2: 0

## 2024-11-18 ASSESSMENT — PAIN SCALES - GENERAL: PAINLEVEL_OUTOF10: 8

## 2024-11-18 NOTE — PROGRESS NOTES
"Chief Complaint  Doris Zhao is a 54 y.o. female presenting for \"Leg Pain (Pt has been having pain to the back of right leg, had x ray and went to knee dr and they told her it all looked normal. Pain has been getting worse in the last month. Has been taking tylenol and or ibuprofen).\"    Leg Pain          Doris Zhao is a 54 y.o. female presenting for right leg pain, had an x-ray that was normal went to Ortho they said there is nothing wrong with it has gotten worse over the last month has been taking Tylenol and/or ibuprofen,       Past Medical History  Patient Active Problem List    Diagnosis Date Noted    Lung nodule 2023    Bronchitis 2023    Yeast infection 2023    Allergic eczema 2023    Anxiety disorder 2023    Ascending aorta dilatation (CMS-HCC) 2023    Chronic GERD 2023    Chronic vaginitis 2023    Lichen simplex chronicus 2019    Hyperlipidemia 2018    Dermatitis, unspecified 10/07/2017        Medications  Current Outpatient Medications   Medication Instructions    albuterol 90 mcg/actuation inhaler INHALE 2 PUFFS BY MOUTH EVERY 4 HOURS AS NEEDED FOR 30 DAYS.    diazePAM (Valium) 2 mg tablet 1 tablet as needed Orally Once a day PRN anxiety for 30 days    predniSONE (DELTASONE) 40 mg, oral, Daily    sucralfate (CARAFATE) 1 g, 2 times daily        Surgical History  She has a past surgical history that includes  section, classic (10/24/2016); Sinus surgery (10/24/2016); and  section, low transverse.     Social History  She reports that she has never smoked. She has never used smokeless tobacco. She reports that she does not currently use alcohol. She reports that she does not use drugs.    Family History  Family History   Problem Relation Name Age of Onset    Breast cancer Cousin          Allergies  Codeine, Balsam peru, Ethylene, Glyceryl-t, Other, Potassium dichromate, Ethylene oxide (gas), Guaifenesin, and Propylene " glycol    ROS  Review of Systems   Constitutional: Negative.    Respiratory: Negative.     Cardiovascular: Negative.    Gastrointestinal: Negative.    Genitourinary: Negative.    Musculoskeletal: Negative.         Last Recorded Vitals  /82 (BP Location: Left arm, Patient Position: Sitting, BP Cuff Size: Adult)   Pulse 79   Temp 36.1 °C (96.9 °F)   Resp 18   Wt 80.7 kg (178 lb)   SpO2 98%     Physical Exam  Vitals and nursing note reviewed.   Constitutional:       Appearance: Normal appearance.   Cardiovascular:      Rate and Rhythm: Normal rate and regular rhythm.      Pulses: Normal pulses.      Heart sounds: Normal heart sounds.   Pulmonary:      Effort: Pulmonary effort is normal.      Breath sounds: Normal breath sounds.   Neurological:      Mental Status: She is alert.         Relevant Results      Assessment/Plan   Doris was seen today for leg pain.  Diagnoses and all orders for this visit:  Right leg pain (Primary)  -     Referral to Physical Therapy; Future  -     predniSONE (Deltasone) 20 mg tablet; Take 2 tablets (40 mg) by mouth once daily for 5 days.  -     LENI without Reflex TEE; Future  -     Sedimentation Rate; Future  -     C-Reactive Protein; Future  -     Uric Acid; Future  -     Rheumatoid Factor; Future  -     Antistreptolysin O Titer; Future          COUNSELING      Medication education:              Education:  The patient is counseled regarding potential side-effects of any and all new medications             Understanding:  Patient expressed understanding             Adherence:  No barriers to adherence identified        Elida Camarillo, APRN-CNP

## 2024-11-19 RX ORDER — DIAZEPAM 2 MG/1
2 TABLET ORAL DAILY PRN
Qty: 7 TABLET | Refills: 0 | Status: SHIPPED | OUTPATIENT
Start: 2024-11-19 | End: 2024-11-26

## 2024-11-19 RX ORDER — ALBUTEROL SULFATE 90 UG/1
2 INHALANT RESPIRATORY (INHALATION) EVERY 4 HOURS PRN
Qty: 18 G | Refills: 1 | Status: SHIPPED | OUTPATIENT
Start: 2024-11-19

## 2024-12-02 ENCOUNTER — LAB (OUTPATIENT)
Dept: LAB | Facility: LAB | Age: 54
End: 2024-12-02
Payer: COMMERCIAL

## 2024-12-02 DIAGNOSIS — M79.604 RIGHT LEG PAIN: ICD-10-CM

## 2024-12-02 LAB
ASO AB SERPL-ACNC: 30 IU/ML (ref 0–250)
CRP SERPL-MCNC: 0.37 MG/DL
ERYTHROCYTE [SEDIMENTATION RATE] IN BLOOD BY WESTERGREN METHOD: 17 MM/H (ref 0–30)
RHEUMATOID FACT SER NEPH-ACNC: <10 IU/ML (ref 0–15)
URATE SERPL-MCNC: 4.7 MG/DL (ref 2.3–6.7)

## 2024-12-02 PROCEDURE — 85652 RBC SED RATE AUTOMATED: CPT

## 2024-12-02 PROCEDURE — 36415 COLL VENOUS BLD VENIPUNCTURE: CPT

## 2024-12-02 PROCEDURE — 86431 RHEUMATOID FACTOR QUANT: CPT

## 2024-12-02 PROCEDURE — 86060 ANTISTREPTOLYSIN O TITER: CPT

## 2024-12-02 PROCEDURE — 86140 C-REACTIVE PROTEIN: CPT

## 2024-12-02 PROCEDURE — 86038 ANTINUCLEAR ANTIBODIES: CPT

## 2024-12-02 PROCEDURE — 84550 ASSAY OF BLOOD/URIC ACID: CPT

## 2024-12-03 LAB — ANA SER QL HEP2 SUBST: NEGATIVE

## 2025-01-06 ENCOUNTER — OFFICE VISIT (OUTPATIENT)
Dept: PRIMARY CARE | Facility: CLINIC | Age: 55
End: 2025-01-06
Payer: COMMERCIAL

## 2025-01-06 ENCOUNTER — LAB (OUTPATIENT)
Dept: LAB | Facility: LAB | Age: 55
End: 2025-01-06
Payer: COMMERCIAL

## 2025-01-06 VITALS
HEART RATE: 79 BPM | SYSTOLIC BLOOD PRESSURE: 110 MMHG | BODY MASS INDEX: 31.25 KG/M2 | OXYGEN SATURATION: 98 % | TEMPERATURE: 96 F | HEIGHT: 65 IN | WEIGHT: 187.6 LBS | DIASTOLIC BLOOD PRESSURE: 80 MMHG

## 2025-01-06 DIAGNOSIS — R19.5 ABNORMAL STOOLS: ICD-10-CM

## 2025-01-06 DIAGNOSIS — F51.01 PRIMARY INSOMNIA: ICD-10-CM

## 2025-01-06 DIAGNOSIS — Z00.00 ANNUAL PHYSICAL EXAM: ICD-10-CM

## 2025-01-06 DIAGNOSIS — E55.9 VITAMIN D DEFICIENCY: ICD-10-CM

## 2025-01-06 DIAGNOSIS — F41.9 ANXIETY DISORDER, UNSPECIFIED TYPE: ICD-10-CM

## 2025-01-06 DIAGNOSIS — Z12.31 ENCOUNTER FOR SCREENING MAMMOGRAM FOR MALIGNANT NEOPLASM OF BREAST: ICD-10-CM

## 2025-01-06 DIAGNOSIS — Z00.00 ANNUAL PHYSICAL EXAM: Primary | ICD-10-CM

## 2025-01-06 LAB
ALBUMIN SERPL BCP-MCNC: 4.7 G/DL (ref 3.4–5)
ALP SERPL-CCNC: 81 U/L (ref 33–110)
ALT SERPL W P-5'-P-CCNC: 14 U/L (ref 7–45)
ANION GAP SERPL CALCULATED.3IONS-SCNC: 11 MMOL/L (ref 10–20)
APPEARANCE UR: CLEAR
AST SERPL W P-5'-P-CCNC: 17 U/L (ref 9–39)
BILIRUB SERPL-MCNC: 0.6 MG/DL (ref 0–1.2)
BILIRUB UR STRIP.AUTO-MCNC: NEGATIVE MG/DL
BUN SERPL-MCNC: 18 MG/DL (ref 6–23)
CALCIUM SERPL-MCNC: 9.5 MG/DL (ref 8.6–10.3)
CHLORIDE SERPL-SCNC: 101 MMOL/L (ref 98–107)
CHOLEST SERPL-MCNC: 171 MG/DL (ref 0–199)
CHOLEST/HDLC SERPL: 3 {RATIO}
CO2 SERPL-SCNC: 31 MMOL/L (ref 21–32)
COLOR UR: NORMAL
CREAT SERPL-MCNC: 0.95 MG/DL (ref 0.5–1.05)
EGFRCR SERPLBLD CKD-EPI 2021: 70 ML/MIN/1.73M*2
ERYTHROCYTE [DISTWIDTH] IN BLOOD BY AUTOMATED COUNT: 12.7 % (ref 11.5–14.5)
GLUCOSE SERPL-MCNC: 85 MG/DL (ref 74–99)
GLUCOSE UR STRIP.AUTO-MCNC: NORMAL MG/DL
HCT VFR BLD AUTO: 43.1 % (ref 36–46)
HDLC SERPL-MCNC: 57 MG/DL
HGB BLD-MCNC: 14.3 G/DL (ref 12–16)
HOLD SPECIMEN: NORMAL
KETONES UR STRIP.AUTO-MCNC: NEGATIVE MG/DL
LDLC SERPL CALC-MCNC: 94 MG/DL
LEUKOCYTE ESTERASE UR QL STRIP.AUTO: NEGATIVE
MCH RBC QN AUTO: 29.4 PG (ref 26–34)
MCHC RBC AUTO-ENTMCNC: 33.2 G/DL (ref 32–36)
MCV RBC AUTO: 89 FL (ref 80–100)
NITRITE UR QL STRIP.AUTO: NEGATIVE
NON HDL CHOLESTEROL: 114 MG/DL (ref 0–149)
NRBC BLD-RTO: 0 /100 WBCS (ref 0–0)
PH UR STRIP.AUTO: 5 [PH]
PLATELET # BLD AUTO: 267 X10*3/UL (ref 150–450)
POTASSIUM SERPL-SCNC: 4.1 MMOL/L (ref 3.5–5.3)
PROT SERPL-MCNC: 7.4 G/DL (ref 6.4–8.2)
PROT UR STRIP.AUTO-MCNC: NEGATIVE MG/DL
RBC # BLD AUTO: 4.86 X10*6/UL (ref 4–5.2)
RBC # UR STRIP.AUTO: NEGATIVE /UL
SODIUM SERPL-SCNC: 139 MMOL/L (ref 136–145)
SP GR UR STRIP.AUTO: 1.02
TRIGL SERPL-MCNC: 101 MG/DL (ref 0–149)
TSH SERPL-ACNC: 2.37 MIU/L (ref 0.44–3.98)
UROBILINOGEN UR STRIP.AUTO-MCNC: NORMAL MG/DL
VLDL: 20 MG/DL (ref 0–40)
WBC # BLD AUTO: 6.5 X10*3/UL (ref 4.4–11.3)

## 2025-01-06 PROCEDURE — 3008F BODY MASS INDEX DOCD: CPT | Performed by: FAMILY MEDICINE

## 2025-01-06 PROCEDURE — 83036 HEMOGLOBIN GLYCOSYLATED A1C: CPT

## 2025-01-06 PROCEDURE — 84443 ASSAY THYROID STIM HORMONE: CPT

## 2025-01-06 PROCEDURE — 81003 URINALYSIS AUTO W/O SCOPE: CPT

## 2025-01-06 PROCEDURE — 82306 VITAMIN D 25 HYDROXY: CPT

## 2025-01-06 PROCEDURE — 80061 LIPID PANEL: CPT

## 2025-01-06 PROCEDURE — 99396 PREV VISIT EST AGE 40-64: CPT | Performed by: FAMILY MEDICINE

## 2025-01-06 PROCEDURE — 1036F TOBACCO NON-USER: CPT | Performed by: FAMILY MEDICINE

## 2025-01-06 PROCEDURE — 80053 COMPREHEN METABOLIC PANEL: CPT

## 2025-01-06 PROCEDURE — 85027 COMPLETE CBC AUTOMATED: CPT

## 2025-01-06 RX ORDER — TRAZODONE HYDROCHLORIDE 100 MG/1
100 TABLET ORAL NIGHTLY PRN
Qty: 30 TABLET | Refills: 5 | Status: SHIPPED | OUTPATIENT
Start: 2025-01-06 | End: 2026-01-06

## 2025-01-06 RX ORDER — CALCITRIOL 0.5 UG/1
0.5 CAPSULE ORAL DAILY
COMMUNITY

## 2025-01-06 RX ORDER — DIAZEPAM 2 MG/1
2 TABLET ORAL DAILY PRN
Qty: 30 TABLET | Refills: 1 | Status: SHIPPED | OUTPATIENT
Start: 2025-01-06 | End: 2025-04-06

## 2025-01-06 ASSESSMENT — LIFESTYLE VARIABLES
SKIP TO QUESTIONS 9-10: 1
HOW MANY STANDARD DRINKS CONTAINING ALCOHOL DO YOU HAVE ON A TYPICAL DAY: 1 OR 2
HOW OFTEN DO YOU HAVE A DRINK CONTAINING ALCOHOL: MONTHLY OR LESS
AUDIT-C TOTAL SCORE: 1
HOW OFTEN DO YOU HAVE SIX OR MORE DRINKS ON ONE OCCASION: NEVER

## 2025-01-06 ASSESSMENT — PAIN SCALES - GENERAL: PAINLEVEL_OUTOF10: 0-NO PAIN

## 2025-01-06 NOTE — PATIENT INSTRUCTIONS
-Get labs done fasting  -Call to establish with new GI for abnormal stools  -Use magnesium as needed for sleep; if not effective, use trazodone as needed for sleep (half tablet, can up to 1 tablet)

## 2025-01-06 NOTE — PROGRESS NOTES
History Of Present Illness  Doris Zhao is a 54 y.o. female presenting for Annual Exam (Yearly physical exam)  .    HPI     Health maintenance: Sees OB/GYN, last Pap 2023 with NILM and negative HPV.  Last mammogram 3/25/2024, BI-RADS 1.  Last colonoscopy 2021.  Last tetanus .  She declines influenza and Shingrix vaccines.    She has chronic anxiety, intermittent panic attacks.  She uses Valium very infrequently.    Occasionally has trouble with sleep.  Tried melatonin without relief.  She is considering trying magnesium.    She reports stools have been abnormal in appearance and floating.     Past Medical History  Patient Active Problem List    Diagnosis Date Noted    Lung nodule 2023    Bronchitis 2023    Yeast infection 2023    Allergic eczema 2023    Anxiety disorder 2023    Ascending aorta dilatation (CMS-HCC) 2023    Chronic GERD 2023    Chronic vaginitis 2023    Lichen simplex chronicus 2019    Hyperlipidemia 2018    Dermatitis, unspecified 10/07/2017        Medications  Current Outpatient Medications   Medication Sig Dispense Refill    albuterol 90 mcg/actuation inhaler Inhale 2 puffs every 4 hours if needed for wheezing. 18 g 1    calcitriol (Rocaltrol) 0.5 mcg capsule Take 1 capsule (0.5 mcg) by mouth once daily.      sucralfate (Carafate) 100 mg/mL suspension Take 10 mL (1 g) by mouth 2 times a day.      diazePAM (Valium) 2 mg tablet Take 1 tablet (2 mg) by mouth once daily as needed for anxiety. 30 tablet 1    traZODone (Desyrel) 100 mg tablet Take 1 tablet (100 mg) by mouth as needed at bedtime for sleep. 30 tablet 5     No current facility-administered medications for this visit.        Surgical History  She has a past surgical history that includes  section, classic (10/24/2016); Sinus surgery (10/24/2016); and  section, low transverse.     Social History  She reports that she has never smoked. She has never used  "smokeless tobacco. She reports current alcohol use. She reports that she does not use drugs.    Family History  Family History   Problem Relation Name Age of Onset    Breast cancer Cousin          Allergies  Codeine, Balsam peru, Ethylene, Glyceryl-t, Other, Potassium dichromate, Ethylene oxide (gas), Guaifenesin, and Propylene glycol    Immunizations    There is no immunization history on file for this patient.     ROS  Negative, except as discussed in HPI     Vitals  /80   Pulse 79   Temp 35.6 °C (96 °F)   Ht 1.651 m (5' 5\")   Wt 85.1 kg (187 lb 9.6 oz)   SpO2 98%   BMI 31.22 kg/m²      Physical Exam  Vitals and nursing note reviewed.   Constitutional:       Appearance: Normal appearance.   HENT:      Head: Normocephalic.      Right Ear: Tympanic membrane normal.      Left Ear: Tympanic membrane normal.      Nose: Nose normal.      Mouth/Throat:      Mouth: Mucous membranes are moist.   Eyes:      Extraocular Movements: Extraocular movements intact.      Conjunctiva/sclera: Conjunctivae normal.      Pupils: Pupils are equal, round, and reactive to light.   Cardiovascular:      Rate and Rhythm: Normal rate and regular rhythm.      Heart sounds: Normal heart sounds.   Pulmonary:      Effort: Pulmonary effort is normal. No respiratory distress.      Breath sounds: Normal breath sounds.   Abdominal:      General: Abdomen is flat.      Palpations: Abdomen is soft.      Tenderness: There is no abdominal tenderness.   Musculoskeletal:      Cervical back: Neck supple.   Lymphadenopathy:      Cervical: No cervical adenopathy.   Skin:     General: Skin is warm and dry.      Findings: No rash.   Neurological:      General: No focal deficit present.      Mental Status: She is alert. Mental status is at baseline.      Coordination: Coordination normal.      Gait: Gait normal.      Deep Tendon Reflexes: Reflexes normal.   Psychiatric:         Mood and Affect: Mood normal.         Behavior: Behavior normal. " "        Relevant Results  Lab Results   Component Value Date    WBC 6.5 01/06/2025    WBC 6.9 06/25/2024    HGB 14.3 01/06/2025    HGB 13.8 06/25/2024    HCT 43.1 01/06/2025    HCT 42.4 06/25/2024    MCV 89 01/06/2025    MCV 90 06/25/2024     01/06/2025     06/25/2024     Lab Results   Component Value Date     06/25/2024     11/13/2023    K 4.6 06/25/2024    K 4.2 11/13/2023     06/25/2024     11/13/2023    CO2 27 06/25/2024    CO2 27 11/13/2023    BUN 17 06/25/2024    BUN 12 11/13/2023    CREATININE 1.10 06/25/2024    CREATININE 1.00 11/13/2023    CALCIUM 9.8 06/25/2024    CALCIUM 9.6 11/13/2023    PROT 7.4 06/25/2024    PROT 7.3 11/13/2023    BILITOT 0.5 06/25/2024    BILITOT 0.3 11/13/2023    ALKPHOS 100 06/25/2024    ALKPHOS 101 11/13/2023    ALT 28 06/25/2024    ALT 28 11/13/2023    AST 27 06/25/2024    AST 28 11/13/2023    GLUCOSE 82 06/25/2024    GLUCOSE 98 11/13/2023     No results found for: \"HGBA1C\"  Lab Results   Component Value Date    TSH 1.33 06/25/2024      Lab Results   Component Value Date    CHOL 155 08/28/2023    TRIG 109 08/28/2023    HDL 47 (L) 08/28/2023           Assessment/Plan   Doris was seen today for annual exam.  Diagnoses and all orders for this visit:  Annual physical exam (Primary)  -     Comprehensive Metabolic Panel; Future  -     Lipid Panel; Future  -     TSH with reflex to Free T4 if abnormal; Future  -     CBC; Future  -     Hemoglobin A1C; Future  -     Vitamin D 25-Hydroxy,Total (for eval of Vitamin D levels); Future  -     Urinalysis with Reflex Culture and Microscopic; Future  -     Referral to Gastroenterology; Future  Encounter for screening mammogram for malignant neoplasm of breast  -     BI mammo bilateral screening tomosynthesis; Future  Anxiety disorder, unspecified type  Comments:  OARRS reviewed.  Consent for treatment with benzo discussed  Orders:  -     diazePAM (Valium) 2 mg tablet; Take 1 tablet (2 mg) by mouth once daily " as needed for anxiety.  Vitamin D deficiency  Primary insomnia  -     traZODone (Desyrel) 100 mg tablet; Take 1 tablet (100 mg) by mouth as needed at bedtime for sleep.  Abnormal stools  -     Referral to Gastroenterology; Future         Counseling:   Medication education:   -Education:  The patient is counseled regarding potential side-effects of any and all new medications  -Understanding:  Patient expressed understanding of information discussed today  -Adherence:  No barriers to adherence identified    Final treatment plan is a result of shared decision making with patient.         Jay Jay Berry MD

## 2025-01-07 LAB
25(OH)D3 SERPL-MCNC: 38 NG/ML (ref 30–100)
EST. AVERAGE GLUCOSE BLD GHB EST-MCNC: 100 MG/DL
HBA1C MFR BLD: 5.1 %

## 2025-02-03 ENCOUNTER — OFFICE VISIT (OUTPATIENT)
Dept: OBSTETRICS AND GYNECOLOGY | Facility: CLINIC | Age: 55
End: 2025-02-03
Payer: COMMERCIAL

## 2025-02-03 VITALS
WEIGHT: 187 LBS | HEIGHT: 65 IN | BODY MASS INDEX: 31.16 KG/M2 | DIASTOLIC BLOOD PRESSURE: 70 MMHG | SYSTOLIC BLOOD PRESSURE: 120 MMHG

## 2025-02-03 DIAGNOSIS — R10.2 SUPRAPUBIC PAIN: ICD-10-CM

## 2025-02-03 DIAGNOSIS — N95.8 GENITOURINARY SYNDROME OF MENOPAUSE: ICD-10-CM

## 2025-02-03 DIAGNOSIS — L29.2 VULVAR ITCHING: Primary | ICD-10-CM

## 2025-02-03 PROCEDURE — 88142 CYTOPATH C/V THIN LAYER: CPT

## 2025-02-03 PROCEDURE — 87624 HPV HI-RISK TYP POOLED RSLT: CPT

## 2025-02-03 PROCEDURE — 99214 OFFICE O/P EST MOD 30 MIN: CPT | Performed by: ADVANCED PRACTICE MIDWIFE

## 2025-02-03 PROCEDURE — 3008F BODY MASS INDEX DOCD: CPT | Performed by: ADVANCED PRACTICE MIDWIFE

## 2025-02-03 RX ORDER — ESTRADIOL 0.1 MG/G
CREAM VAGINAL
Qty: 34 G | Refills: 3 | Status: SHIPPED | OUTPATIENT
Start: 2025-02-03

## 2025-02-03 RX ORDER — CLOBETASOL PROPIONATE 0.5 MG/G
OINTMENT TOPICAL 2 TIMES DAILY
Qty: 30 G | Refills: 0 | Status: SHIPPED | OUTPATIENT
Start: 2025-02-03

## 2025-02-03 ASSESSMENT — ENCOUNTER SYMPTOMS
HEMATOLOGIC/LYMPHATIC NEGATIVE: 0
MUSCULOSKELETAL NEGATIVE: 0
RESPIRATORY NEGATIVE: 0
ALLERGIC/IMMUNOLOGIC NEGATIVE: 0
ENDOCRINE NEGATIVE: 0
CARDIOVASCULAR NEGATIVE: 0
GASTROINTESTINAL NEGATIVE: 0
CONSTITUTIONAL NEGATIVE: 0
EYES NEGATIVE: 0
NEUROLOGICAL NEGATIVE: 0
PSYCHIATRIC NEGATIVE: 0

## 2025-02-03 ASSESSMENT — PAIN SCALES - GENERAL: PAINLEVEL_OUTOF10: 0-NO PAIN

## 2025-02-03 NOTE — PROGRESS NOTES
Subjective   Patient ID: Doris Zhao is a 54 y.o. female who presents for Pelvic Pain.  HPI    Pelvic pain  Suprapubic   Started 1 week ago  Constant-  Shooting-constant dull ache  Denies dysuria, foul odor,   May have some discharge-     Vaginal itching   10 x several years  Biopsy done- negative-   Saw derm  Has been passed back and forth between derm/GYN  Used hydrocortisone cream    Not sexually active     -low libido  Hot flashes     No menses 3-4 yrs ago  Review of Systems   Genitourinary:  Positive for dyspareunia, genital sores, pelvic pain, vaginal bleeding and vaginal pain. Negative for difficulty urinating and dysuria.   All other systems reviewed and are negative.      Objective   Physical Exam  Constitutional:       Appearance: Normal appearance. She is normal weight.   Pulmonary:      Effort: Pulmonary effort is normal.   Abdominal:      General: Abdomen is flat. Bowel sounds are normal.      Palpations: Abdomen is soft. There is no mass.      Tenderness: There is no abdominal tenderness. There is no right CVA tenderness, left CVA tenderness, guarding or rebound.      Hernia: No hernia is present.   Genitourinary:     Labia:         Right: No rash, tenderness, lesion or injury.         Left: No rash, tenderness, lesion or injury.       Vagina: Tenderness and bleeding present. No vaginal discharge or erythema.      Cervix: No friability or cervical bleeding.      Comments: Atrophic changes noted   Scant bright red bleeding noted upon opening speculum- no blood appearing to come from cervix  Tissues is pale in color   Musculoskeletal:         General: Normal range of motion.      Cervical back: Normal range of motion.   Skin:     General: Skin is warm and dry.   Psychiatric:         Mood and Affect: Mood normal.         Behavior: Behavior normal.         Thought Content: Thought content normal.         Judgment: Judgment normal.         Assessment/Plan   Problem List Items Addressed This Visit     None  Visit Diagnoses         Codes    Vulvar itching    -  Primary L29.2    Relevant Medications    clobetasol (Temovate) 0.05 % ointment    Other Relevant Orders    Vaginitis Gram Stain For Bacterial Vaginosis + Yeast (Completed)    Fungal Culture/Smear (Completed)    Suprapubic pain     R10.2    Relevant Orders    THINPREP PAP TEST    Urine Culture (Completed)    US PELVIS TRANSABDOMINAL WITH TRANSVAGINAL    HPV DNA High Risk With Genotype    Genitourinary syndrome of menopause     N95.8    Relevant Medications    estradiol (Estrace) 0.01 % (0.1 mg/gram) vaginal cream          Plan to set patient up with possible hysteroscopy or form of endometrial sampling- due to history of PMB with insufficient sample previously with Dr. Emery Sanchez, MARLENE-HAMIDA 02/03/25 9:52 AM

## 2025-02-04 ENCOUNTER — TELEPHONE (OUTPATIENT)
Dept: OBSTETRICS AND GYNECOLOGY | Facility: CLINIC | Age: 55
End: 2025-02-04
Payer: COMMERCIAL

## 2025-02-05 LAB
BACTERIA UR CULT: ABNORMAL
BV SCORE VAG QL: NORMAL
FUNGUS SPEC FUNGUS STN: NORMAL

## 2025-02-05 ASSESSMENT — ENCOUNTER SYMPTOMS
DYSURIA: 0
DIFFICULTY URINATING: 0

## 2025-02-07 ENCOUNTER — HOSPITAL ENCOUNTER (OUTPATIENT)
Dept: RADIOLOGY | Facility: CLINIC | Age: 55
Discharge: HOME | End: 2025-02-07
Payer: COMMERCIAL

## 2025-02-07 DIAGNOSIS — R10.2 SUPRAPUBIC PAIN: ICD-10-CM

## 2025-02-07 PROCEDURE — 76856 US EXAM PELVIC COMPLETE: CPT

## 2025-02-11 ENCOUNTER — TELEPHONE (OUTPATIENT)
Dept: OBSTETRICS AND GYNECOLOGY | Facility: CLINIC | Age: 55
End: 2025-02-11
Payer: COMMERCIAL

## 2025-02-11 DIAGNOSIS — R10.2 SUPRAPUBIC PAIN: ICD-10-CM

## 2025-02-11 DIAGNOSIS — R39.9 URINARY TRACT INFECTION SYMPTOMS: ICD-10-CM

## 2025-02-11 PROBLEM — I49.1 ATRIAL PREMATURE DEPOLARIZATION: Status: ACTIVE | Noted: 2025-02-11

## 2025-02-11 PROBLEM — K31.7 GASTRIC POLYP: Status: ACTIVE | Noted: 2025-02-11

## 2025-02-11 PROBLEM — E55.9 VITAMIN D DEFICIENCY: Status: ACTIVE | Noted: 2025-02-11

## 2025-02-11 PROBLEM — E66.9 OBESITY (BMI 30-39.9): Status: ACTIVE | Noted: 2025-02-11

## 2025-02-11 PROBLEM — N95.0 POSTMENOPAUSAL BLEEDING: Status: ACTIVE | Noted: 2023-04-06

## 2025-02-11 PROBLEM — L29.2 VULVAR ITCHING: Status: ACTIVE | Noted: 2025-02-11

## 2025-02-11 PROBLEM — N90.89 LESION OF VULVA: Status: ACTIVE | Noted: 2020-01-03

## 2025-02-11 LAB
BV SCORE VAG QL: NORMAL
FUNGUS SPEC FUNGUS STN: NORMAL

## 2025-02-11 NOTE — TELEPHONE ENCOUNTER
Pt called RN to schedule hysteroscopy  Pt verified by name and   Pt requesting information about hysteroscopy - does she need to fast? Does she need to take off work the next day? Will she be placed under general anesthesia? Will she be in pain after? Will they take sampling?   RN advised pt that fasting is not needed, pt understands she is not required to take off from medical standpoint but may due to pain tolerance, no general anesthesia will be used, pt may experience pain/discomfort and some bloody discharge for a few days after, pt understands RN will add note for possible EMBx  RN scheduled pt for 25 at 11 AM with Dr. Nava  Pt also asked about recent urine culture and vaginitis swab results  RN explained that pt does not have BV, but has low levels of healthy bacteria which may be reason for symptoms  RN also explained that her urine culture showed some bacteria that, per Jean-Paul, may be due to contamination. Pt to repeat culture if pain still persists  Pt states that she will go re do the urine culture  RN placed order for culture  Pt verbalized understanding.  No further questions or concerns at this time.

## 2025-02-14 ENCOUNTER — DOCUMENTATION (OUTPATIENT)
Dept: OBSTETRICS AND GYNECOLOGY | Facility: CLINIC | Age: 55
End: 2025-02-14
Payer: COMMERCIAL

## 2025-02-14 LAB
CYTOLOGY CMNT CVX/VAG CYTO-IMP: NORMAL
HPV HR 12 DNA GENITAL QL NAA+PROBE: NEGATIVE
HPV HR GENOTYPES PNL CVX NAA+PROBE: NEGATIVE
HPV16 DNA SPEC QL NAA+PROBE: NEGATIVE
HPV18 DNA SPEC QL NAA+PROBE: NEGATIVE
LAB AP HPV GENOTYPE QUESTION: YES
LAB AP HPV HR: NORMAL
LABORATORY COMMENT REPORT: NORMAL
LABORATORY COMMENT REPORT: NORMAL
MENSTRUAL HX REPORTED: NORMAL
PATH REPORT.TOTAL CANCER: NORMAL

## 2025-02-14 NOTE — PROGRESS NOTES
Contacted pt  Name and  verified  Spoke with pt states she can come in on 2025 for hysteroscopy at 1 pm. Told her it is a tentative sooner appt we will confirm ahead of time. Pt is scheduled for 2025 will leave there in case 2025 does not work out for office scheduling.  Pt verbalized understanding.  No further questions or concerns at this time.

## 2025-02-19 LAB — BACTERIA UR CULT: NORMAL

## 2025-03-04 ENCOUNTER — DOCUMENTATION (OUTPATIENT)
Dept: OBSTETRICS AND GYNECOLOGY | Facility: CLINIC | Age: 55
End: 2025-03-04
Payer: COMMERCIAL

## 2025-03-07 ENCOUNTER — APPOINTMENT (OUTPATIENT)
Dept: OBSTETRICS AND GYNECOLOGY | Facility: CLINIC | Age: 55
End: 2025-03-07
Payer: COMMERCIAL

## 2025-03-07 VITALS
WEIGHT: 184 LBS | HEIGHT: 65 IN | DIASTOLIC BLOOD PRESSURE: 82 MMHG | SYSTOLIC BLOOD PRESSURE: 118 MMHG | BODY MASS INDEX: 30.66 KG/M2

## 2025-03-07 DIAGNOSIS — N94.10 DYSPAREUNIA IN FEMALE: ICD-10-CM

## 2025-03-07 DIAGNOSIS — L29.2 VULVAR ITCHING: Primary | ICD-10-CM

## 2025-03-07 PROCEDURE — 99214 OFFICE O/P EST MOD 30 MIN: CPT | Performed by: OBSTETRICS & GYNECOLOGY

## 2025-03-07 ASSESSMENT — ENCOUNTER SYMPTOMS
MUSCULOSKELETAL NEGATIVE: 0
RESPIRATORY NEGATIVE: 0
CARDIOVASCULAR NEGATIVE: 0
ALLERGIC/IMMUNOLOGIC NEGATIVE: 0
CONSTITUTIONAL NEGATIVE: 0
GASTROINTESTINAL NEGATIVE: 0
PSYCHIATRIC NEGATIVE: 0
NEUROLOGICAL NEGATIVE: 0
ENDOCRINE NEGATIVE: 0
EYES NEGATIVE: 0
HEMATOLOGIC/LYMPHATIC NEGATIVE: 0

## 2025-03-07 ASSESSMENT — PAIN SCALES - GENERAL: PAINLEVEL_OUTOF10: 0-NO PAIN

## 2025-03-07 NOTE — PATIENT INSTRUCTIONS
Thanks for coming in today for follow-up.      Labs are cultures are being sent today to help evaluate your vulvar itching.  Results should be available in the next 24 to 48 hours.  You may call the office and select option #2 to speak with the nurse to obtain the results.      Review the information on vulvovaginal care and postmenopausal vaginal and vulvar symptoms.      Return to the office next few weeks to likely have endometrial biopsy performed to better evaluate your continuous itching/irritation.      Follow-up with your PCP and other healthcare specialist as needed.      Feel free to call the office with any problems, questions or concerns prior to your next scheduled visit.      Follow-up with Pelvic Floor Physical Therapy about pain during intercourse.

## 2025-03-08 LAB
BV SCORE VAG QL: NORMAL
HSV1 DNA SPEC QL NAA+PROBE: NORMAL
HSV2 DNA SPEC QL NAA+PROBE: NORMAL
SPECIMEN SOURCE: NORMAL

## 2025-03-10 ENCOUNTER — TELEPHONE (OUTPATIENT)
Dept: OBSTETRICS AND GYNECOLOGY | Facility: CLINIC | Age: 55
End: 2025-03-10
Payer: COMMERCIAL

## 2025-03-10 LAB
BV SCORE VAG QL: NORMAL
HSV1 DNA SPEC QL NAA+PROBE: NOT DETECTED
HSV1 IGG SER IA-ACNC: 22 INDEX
HSV2 DNA SPEC QL NAA+PROBE: NOT DETECTED
HSV2 IGG SER IA-ACNC: <0.9 INDEX
SPECIMEN SOURCE: NORMAL

## 2025-03-10 NOTE — PROGRESS NOTES
Assessment and Plan:  Lenore Zhao is a 55 y/o woman presenting today for vulvar itching.     Diagnoses:  #1 Vulvar itching  #2 Dyspareunia    Assessment/Plan:  1. Recurrent/persistent vulvar itching  - Discussed with patient the itch-scratch cycle.  - Discussed with patient possibly using an antihistamine or the Zantac that she already has prescribed, to see if she can get out of that itch-scratch cycle.  - Her nurse midwife who saw her recently is investigating a compounding pharmacy for vaginal estrogens.  - The patient does note a history of a TIA in the past, so oral estrogens should not be used.    2. Dyspareunia  - Referral for pelvic floor physical therapy made, as vaginal dryness with atrophy and pain during intercourse can lead to pelvic floor dysfunction as patient is anticipating having pain with acts of intercourse.  - Discussed with patient good vulvovaginal care.  - Discussed with patient vaginal estrogens. We will have to do some further investigations to make sure that the vehicle that the medication is in, is not something she is allergic to.    3. Possible postmenopausal bleeding - after a detailed history was obtained, the patient is not having postmenopausal bleeding, but rather vaginal bleeding with intercourse that has been documented related to vaginal dryness with irritation  - Therefore, hysteroscopy and endometrial sampling will not be repeated unless patient has evidence of abnormal uterine bleeding.  - Return to the office for possible vulvar biopsy after all of her results are in.    Follow up with Dr. Oglesby.    Naveed Attestation  By signing my name below, I, Naveed Allred, attest that this documentation has been prepared under the direction and in the presence of Erlinda Oglesby MD on 3/10/2025 at 10:18 AM.     HPI:   Lenore Zhao is a 55 y/o woman presenting today for vulvar itching. She also reports dyspareunia and postcoital vaginal bleeding. She reports hot flashes  and night sweats that started about 1 year ago.    She reports an allergy to propylene glycol, which is used in many vaginal estrogen creams. She was seen by an allergist a few weeks ago.    Past medical hx: Ulcerative colitis.    Family medical hx: Maternal and sister have had hysterectomies. Paternal cousin had breast cancer. Maternal aunt had stomach cancer.     Surgical hx: Polyp removal from her sinus cavity. Odontectomy.    Social hx: Quit smoking. No vaping or drug use. Rare alcohol use. Currently with a partner.    GYN hx: M10y/o. Menopause began around age 50. Not currently SA due to dyspareunia. No hx of abnormal pap smears. No hx of sexual abuse or rape.     OB hx: .  section due to prolonged labor x1.     ROS:  Review of Systems   Constitutional:         Positive for hot flashes.  Positive for night sweats.   Genitourinary:  Positive for dyspareunia and vaginal bleeding (after intercourse).        Positive for vulvar itching.     Physical Exam:   Physical Exam  Constitutional:       General: She is not in acute distress.     Appearance: Normal appearance. She is normal weight.   Genitourinary:      Vulva exam comments:  Appears grossly normal. There is no discoloration or lesions. There are a few superficial, likely scratch marks and not ulcerative lesions near the clitoral area and also on the left labia and perianal area. Those are being swabbed for HSV 1 and 2 today. But otherwise, normal appearing external female genitalia, normal Bartholin's and Ocean Breeze's glands bilaterally.      Vaginal exam comments: Somewhat atrophic but not exceedingly dry. No bleeding is noted with insertion of a Q-tip. .   Neurological:      Mental Status: She is alert and oriented to person, place, and time.      Comments: Pleasant and cooperative

## 2025-03-11 RX ORDER — METRONIDAZOLE 500 MG/1
500 TABLET ORAL 2 TIMES DAILY
Qty: 14 TABLET | Refills: 0 | Status: SHIPPED | OUTPATIENT
Start: 2025-03-11 | End: 2025-03-18

## 2025-03-11 NOTE — TELEPHONE ENCOUNTER
Called pt, no answer, left voicemail for return call. Pt called in with a question regarding EMB that was scheduled, she thought it was to be a vulvar biopsy. Received clarification from Dr. Oglesby, pt is to be scheduled for vulvar colposcopy with possible vulvar biopsy. Scheduled pt for first available time with Dr. Oglesby, 04/23/2025 at 2:15p

## 2025-03-11 NOTE — TELEPHONE ENCOUNTER
Pt returned call. Explained the mixup with appointment, patient is not to have EMB, she is to have vulvar colpo with possible vulvar biopsy. Scheduled patient for first available with Dr. Oglesby,4/23/2025 at 2:15 p. Pt had concerns about recent labs from most recent visit, reviewed those and Dr. Oglesby's recommendations. Pt also had questions regarding medications  she said Dr. Oglesby was to send out for her, did not see this noted in visit. Sent Dr. Oglesby a message for further advising.

## 2025-03-14 ENCOUNTER — APPOINTMENT (OUTPATIENT)
Dept: OBSTETRICS AND GYNECOLOGY | Facility: CLINIC | Age: 55
End: 2025-03-14
Payer: COMMERCIAL

## 2025-03-14 ENCOUNTER — TELEPHONE (OUTPATIENT)
Dept: PRIMARY CARE | Facility: CLINIC | Age: 55
End: 2025-03-14

## 2025-03-14 NOTE — TELEPHONE ENCOUNTER
Patient states she was in ER last week - CT and MRI were done she saw GI and they put her on medications and she is still having upper abdomen pains and they radiate to her back - she wants to talk to PCP about it and review her scans.   Is this ok to add for a telehealth follow up to discuss?

## 2025-03-17 ENCOUNTER — APPOINTMENT (OUTPATIENT)
Dept: OBSTETRICS AND GYNECOLOGY | Facility: CLINIC | Age: 55
End: 2025-03-17
Payer: COMMERCIAL

## 2025-03-17 NOTE — TELEPHONE ENCOUNTER
"PT CALLING TO CONFIRM SHE WAS IN ER AND \"DID NOT\" HAVE AN MRI SHE HAD A CT SCAN AND SHE IS STILL HAVING PAIN AND IS CONCERNED.  ASKING IF SHE CAN BE SENT FOR AN MRI ASKING ABOUT A VIRTUAL APPT ALSO   "

## 2025-03-19 ENCOUNTER — TELEPHONE (OUTPATIENT)
Dept: OBSTETRICS AND GYNECOLOGY | Facility: CLINIC | Age: 55
End: 2025-03-19
Payer: COMMERCIAL

## 2025-03-19 ENCOUNTER — TELEPHONE (OUTPATIENT)
Dept: PRIMARY CARE | Facility: CLINIC | Age: 55
End: 2025-03-19
Payer: COMMERCIAL

## 2025-03-19 NOTE — TELEPHONE ENCOUNTER
Pt verified by name and .  Pt calling states she started Flagyl 4 days ago.  Pt states her urine is dark brownish.  Pt denies frequency or urgency.  Pt questions if flagyl is making her urine dark.  Pt aware nurse discussed with Dr. Oglesby.  Pt may come to office tomorrow to leave urine specimen.  Nurse scheduled pt for nurse visit. [id number].  Pt is aware per Dr. Oglesby she may skip her flagyl tonight.  Pt has no questions at this time.

## 2025-03-19 NOTE — TELEPHONE ENCOUNTER
Pt would like an US order to be placed or MRI to further examine where pain could be coming from. Pt is also stating urine is dark brown in color. Pt is very upset and is inquiring about answers. Please advise.

## 2025-03-20 ENCOUNTER — OFFICE VISIT (OUTPATIENT)
Dept: OBSTETRICS AND GYNECOLOGY | Facility: CLINIC | Age: 55
End: 2025-03-20
Payer: COMMERCIAL

## 2025-03-20 ENCOUNTER — APPOINTMENT (OUTPATIENT)
Dept: OBSTETRICS AND GYNECOLOGY | Facility: CLINIC | Age: 55
End: 2025-03-20
Payer: COMMERCIAL

## 2025-03-20 DIAGNOSIS — R39.89 ABNORMAL URINE COLOR: ICD-10-CM

## 2025-03-20 LAB
POC APPEARANCE, URINE: CLEAR
POC BILIRUBIN, URINE: NEGATIVE
POC BLOOD, URINE: NEGATIVE
POC COLOR, URINE: YELLOW
POC GLUCOSE, URINE: NEGATIVE MG/DL
POC KETONES, URINE: NEGATIVE MG/DL
POC LEUKOCYTES, URINE: NEGATIVE
POC NITRITE,URINE: NEGATIVE
POC PH, URINE: 6 PH
POC PROTEIN, URINE: NEGATIVE MG/DL
POC SPECIFIC GRAVITY, URINE: <=1.005
POC UROBILINOGEN, URINE: 0.2 EU/DL

## 2025-03-20 PROCEDURE — 81003 URINALYSIS AUTO W/O SCOPE: CPT | Performed by: OBSTETRICS & GYNECOLOGY

## 2025-03-20 NOTE — PROGRESS NOTES
Pt presented to office with c/o abnormal urine color  Pt verified by name and   Pt instructed urine sample for UA to be performed  Pt left sample, MA performed urine dip  Results discussed with Dr. Mendel Oglesby advised pt to continue to take antibiotics until course is finished and follow up with her PCP  Pt verbalized understanding.  No further questions or concerns at this time.

## 2025-03-22 ENCOUNTER — HOSPITAL ENCOUNTER (OUTPATIENT)
Dept: RADIOLOGY | Facility: HOSPITAL | Age: 55
Discharge: HOME | End: 2025-03-22
Payer: COMMERCIAL

## 2025-03-22 ENCOUNTER — APPOINTMENT (OUTPATIENT)
Dept: RADIOLOGY | Facility: HOSPITAL | Age: 55
End: 2025-03-22
Payer: COMMERCIAL

## 2025-03-22 DIAGNOSIS — R10.12 LEFT UPPER QUADRANT PAIN: ICD-10-CM

## 2025-03-22 PROCEDURE — 76700 US EXAM ABDOM COMPLETE: CPT | Performed by: RADIOLOGY

## 2025-03-22 PROCEDURE — 76700 US EXAM ABDOM COMPLETE: CPT

## 2025-03-26 ENCOUNTER — HOSPITAL ENCOUNTER (OUTPATIENT)
Dept: RADIOLOGY | Facility: HOSPITAL | Age: 55
Discharge: HOME | End: 2025-03-26
Payer: COMMERCIAL

## 2025-03-26 VITALS — HEIGHT: 65 IN | BODY MASS INDEX: 29.16 KG/M2 | WEIGHT: 175 LBS

## 2025-03-26 DIAGNOSIS — Z12.31 ENCOUNTER FOR SCREENING MAMMOGRAM FOR MALIGNANT NEOPLASM OF BREAST: ICD-10-CM

## 2025-03-26 PROCEDURE — 77067 SCR MAMMO BI INCL CAD: CPT | Performed by: RADIOLOGY

## 2025-03-26 PROCEDURE — 77067 SCR MAMMO BI INCL CAD: CPT

## 2025-03-26 PROCEDURE — 77063 BREAST TOMOSYNTHESIS BI: CPT | Performed by: RADIOLOGY

## 2025-03-28 ENCOUNTER — HOSPITAL ENCOUNTER (OUTPATIENT)
Dept: RADIOLOGY | Facility: HOSPITAL | Age: 55
Discharge: HOME | End: 2025-03-28
Payer: COMMERCIAL

## 2025-03-28 DIAGNOSIS — R68.81 EARLY SATIETY: ICD-10-CM

## 2025-03-28 DIAGNOSIS — K63.89 OTHER SPECIFIED DISEASES OF INTESTINE: ICD-10-CM

## 2025-03-28 DIAGNOSIS — R10.12 LEFT UPPER QUADRANT PAIN: ICD-10-CM

## 2025-03-28 PROCEDURE — A9541 TC99M SULFUR COLLOID: HCPCS

## 2025-03-28 PROCEDURE — 3430000001 HC RX 343 DIAGNOSTIC RADIOPHARMACEUTICALS

## 2025-03-28 PROCEDURE — 78264 GASTRIC EMPTYING IMG STUDY: CPT

## 2025-03-28 RX ADMIN — TECHNETIUM TC 99M SULFUR COLLOID 1 MILLICURIE: KIT at 06:58

## 2025-03-31 ENCOUNTER — HOSPITAL ENCOUNTER (OUTPATIENT)
Dept: RADIOLOGY | Facility: HOSPITAL | Age: 55
Discharge: HOME | End: 2025-03-31
Payer: COMMERCIAL

## 2025-03-31 ENCOUNTER — TELEMEDICINE (OUTPATIENT)
Dept: PRIMARY CARE | Facility: CLINIC | Age: 55
End: 2025-03-31
Payer: COMMERCIAL

## 2025-03-31 DIAGNOSIS — R68.81 EARLY SATIETY: ICD-10-CM

## 2025-03-31 DIAGNOSIS — R10.84 GENERALIZED ABDOMINAL PAIN: Primary | ICD-10-CM

## 2025-03-31 DIAGNOSIS — R10.12 LEFT UPPER QUADRANT PAIN: ICD-10-CM

## 2025-03-31 DIAGNOSIS — K63.89 OTHER SPECIFIED DISEASES OF INTESTINE: ICD-10-CM

## 2025-03-31 PROCEDURE — 1036F TOBACCO NON-USER: CPT | Performed by: FAMILY MEDICINE

## 2025-03-31 PROCEDURE — A9541 TC99M SULFUR COLLOID: HCPCS

## 2025-03-31 PROCEDURE — 3430000001 HC RX 343 DIAGNOSTIC RADIOPHARMACEUTICALS

## 2025-03-31 PROCEDURE — 99213 OFFICE O/P EST LOW 20 MIN: CPT | Performed by: FAMILY MEDICINE

## 2025-03-31 PROCEDURE — 78264 GASTRIC EMPTYING IMG STUDY: CPT

## 2025-03-31 PROCEDURE — 78264 GASTRIC EMPTYING IMG STUDY: CPT | Performed by: STUDENT IN AN ORGANIZED HEALTH CARE EDUCATION/TRAINING PROGRAM

## 2025-03-31 RX ORDER — GABAPENTIN 100 MG/1
100 CAPSULE ORAL 2 TIMES DAILY PRN
Qty: 90 CAPSULE | Refills: 0 | Status: SHIPPED | OUTPATIENT
Start: 2025-03-31 | End: 2025-09-27

## 2025-03-31 RX ADMIN — TECHNETIUM TC 99M SULFUR COLLOID 1 MILLICURIE: KIT at 07:00

## 2025-03-31 ASSESSMENT — ENCOUNTER SYMPTOMS
BELCHING: 1
CONSTIPATION: 0
VOMITING: 0
ABDOMINAL PAIN: 1
FLATUS: 0
HEMATURIA: 0
ANOREXIA: 1
WEIGHT LOSS: 1
ARTHRALGIAS: 0
DIARRHEA: 0
HEMATOCHEZIA: 0
FEVER: 0
HEADACHES: 0
DYSURIA: 0
NAUSEA: 1
MYALGIAS: 0
FREQUENCY: 0

## 2025-03-31 ASSESSMENT — LIFESTYLE VARIABLES
SKIP TO QUESTIONS 9-10: 1
HOW OFTEN DO YOU HAVE SIX OR MORE DRINKS ON ONE OCCASION: NEVER
HOW OFTEN DO YOU HAVE A DRINK CONTAINING ALCOHOL: MONTHLY OR LESS
AUDIT-C TOTAL SCORE: 1
HOW MANY STANDARD DRINKS CONTAINING ALCOHOL DO YOU HAVE ON A TYPICAL DAY: 1 OR 2

## 2025-03-31 ASSESSMENT — PAIN SCALES - GENERAL: PAINLEVEL_OUTOF10: 5

## 2025-03-31 NOTE — PROGRESS NOTES
With patient's permission this is a telemedicine visit with video and audio.    History Of Present Illness  Doris Zhao is a 54 y.o. female who calls in for Follow-up (Hospital follow up on ABD pain./Pt unable to obtain vitals.).    Abdominal Pain  This is a chronic problem. The current episode started 1 to 4 weeks ago. The onset quality is sudden. The problem occurs constantly. The most recent episode lasted 24 hours. The problem has been waxing and waning. The pain is located in the LUQ and epigastric region. The pain is at a severity of 6/10. The quality of the pain is a sensation of fullness and sharp. The abdominal pain radiates to the LUQ. Associated symptoms include anorexia, belching, nausea and weight loss. Pertinent negatives include no arthralgias, constipation, diarrhea, dysuria, fever, flatus, frequency, headaches, hematochezia, hematuria, melena, myalgias or vomiting. The pain is aggravated by certain positions. Prior diagnostic workup includes CT scan and ultrasound.     Still having pain in RUQ.  Went to the ED on 3/7/25 for epigastric pain that wraps around to the left back. CT angio of chest, abdomen, and pelvis were nonacute.  She has since followed up with gastroenterology outpatient.  GI prescribed hydroxyzine but didn't try it; dicyclomine took one but didn't like it.  Gastric emptying liquid testing was done 3/28/2025 and was normal.  Has gastric emptying of solid food testing today. Has appt with GI 4/7/25.    She feels that her anxiety is worsened and that causes worsening of her abdominal pain.      Past Medical History  She has a past medical history of Anxiety and Asthma.    She has no past medical history of Personal history of irradiation.    Medications  Current Outpatient Medications on File Prior to Visit   Medication Sig Dispense Refill    albuterol 90 mcg/actuation inhaler Inhale 2 puffs every 4 hours if needed for wheezing. 18 g 1    diazePAM (Valium) 2 mg tablet Take 1 tablet  (2 mg) by mouth once daily as needed for anxiety. 30 tablet 1    sucralfate (Carafate) 100 mg/mL suspension Take 10 mL (1 g) by mouth 2 times a day.      traZODone (Desyrel) 100 mg tablet Take 1 tablet (100 mg) by mouth as needed at bedtime for sleep. 30 tablet 5    calcitriol (Rocaltrol) 0.5 mcg capsule Take 1 capsule (0.5 mcg) by mouth once daily.      estradiol (Estrace) 0.01 % (0.1 mg/gram) vaginal cream At bedtime for 2 weeks, then at bedtime twice a week. 34 g 3     Current Facility-Administered Medications on File Prior to Visit   Medication Dose Route Frequency Provider Last Rate Last Admin    [COMPLETED] Tc-99m sulfur colloid 1 millicurie  1 millicurie oral Once in imaging Frederick Moeyr, MARLENE-CNP   1 millicurie at 25 0700        Surgical History  She has a past surgical history that includes  section, classic (10/24/2016); Sinus surgery (10/24/2016);  section, low transverse; and [place] tooth extraction (N/A).     Social History  She reports that she has quit smoking. Her smoking use included cigarettes. She has never used smokeless tobacco. She reports that she does not currently use alcohol. She reports that she does not use drugs.    Family History  Family History   Problem Relation Name Age of Onset    Stomach cancer Father's Sister      Breast cancer Cousin      Breast cancer Cousin      Stomach cancer Father's Sister mothers sister my aunt         Allergies  Codeine, Balsam peru, Ethylene, Glyceryl-t, Other, Potassium dichromate, Ethylene oxide (gas), Guaifenesin, and Propylene glycol    On video:     Appearance: Normal appearance.      Effort: Respiratory effort is normal. Speaking in full sentences. No respiratory distress.     Mood and Affect: Mood normal.         Thought Content: Thought content normal.         Judgment: Judgment normal.      Last Recorded Vitals  There were no vitals taken for this visit.  (Vitals are taken by patient at home, if any reported)    Relevant  Results      Assessment/Plan   Doris was seen today for follow-up.  Diagnoses and all orders for this visit:  Generalized abdominal pain (Primary)  Comments:  Symptomatic control at this time.  Follow-up with GI as advised.  If pain worsens to seek emergent care  Orders:  -     gabapentin (Neurontin) 100 mg capsule; Take 1 capsule (100 mg) by mouth 2 times a day as needed (abdominal pain).  -     Urinalysis with Reflex Culture and Microscopic; Future  -     Urinalysis with Reflex Culture and Microscopic          There are no discontinued medications.     Jay Jay Berry MD

## 2025-04-07 ENCOUNTER — APPOINTMENT (OUTPATIENT)
Dept: OBSTETRICS AND GYNECOLOGY | Facility: CLINIC | Age: 55
End: 2025-04-07
Payer: COMMERCIAL

## 2025-04-17 ENCOUNTER — TELEPHONE (OUTPATIENT)
Dept: OBSTETRICS AND GYNECOLOGY | Facility: CLINIC | Age: 55
End: 2025-04-17
Payer: COMMERCIAL

## 2025-04-23 ENCOUNTER — APPOINTMENT (OUTPATIENT)
Dept: OBSTETRICS AND GYNECOLOGY | Facility: CLINIC | Age: 55
End: 2025-04-23
Payer: COMMERCIAL

## 2025-04-23 VITALS
HEIGHT: 65 IN | BODY MASS INDEX: 29.82 KG/M2 | SYSTOLIC BLOOD PRESSURE: 102 MMHG | WEIGHT: 179 LBS | DIASTOLIC BLOOD PRESSURE: 80 MMHG

## 2025-04-23 DIAGNOSIS — L29.2 VULVAR ITCHING: Primary | ICD-10-CM

## 2025-04-23 ASSESSMENT — ENCOUNTER SYMPTOMS
PSYCHIATRIC NEGATIVE: 0
ALLERGIC/IMMUNOLOGIC NEGATIVE: 0
RESPIRATORY NEGATIVE: 0
CARDIOVASCULAR NEGATIVE: 0
ENDOCRINE NEGATIVE: 0
GASTROINTESTINAL NEGATIVE: 0
NEUROLOGICAL NEGATIVE: 0
MUSCULOSKELETAL NEGATIVE: 0
HEMATOLOGIC/LYMPHATIC NEGATIVE: 0
CONSTITUTIONAL NEGATIVE: 0
EYES NEGATIVE: 0

## 2025-04-23 NOTE — PATIENT INSTRUCTIONS
Thanks for coming in today for follow-up.    Vulvar biopsies were sent.  Results should be available in the next 2 weeks.  Please call the office and select option #2 to speak with the nurse to obtain the results.    Review the after procedure instruction sheet.

## 2025-04-24 ENCOUNTER — TELEPHONE (OUTPATIENT)
Dept: OBSTETRICS AND GYNECOLOGY | Facility: CLINIC | Age: 55
End: 2025-04-24
Payer: COMMERCIAL

## 2025-04-25 ENCOUNTER — TELEPHONE (OUTPATIENT)
Dept: OBSTETRICS AND GYNECOLOGY | Facility: CLINIC | Age: 55
End: 2025-04-25
Payer: COMMERCIAL

## 2025-04-27 NOTE — PROGRESS NOTES
55 yo  post menopausal woman presents today for follow up of chronic intermittent vulvar itching.     ..Colposcopy    Date/Time: 2025 3:15 PM    Performed by: Erlinda Oglesby MD  Authorized by: Erlinda Oglesby MD    Procedure location: vulva    Consent:     Patient questions answered: yes      Risks and benefits of the procedure and its alternatives discussed: yes      Procedural risks discussed:  Bleeding, infection, repeat procedure and possible continued pain    Consent obtained:  Verbal    Consent given by:  Patient  Universal Protocol:     A time out verifies correct patient, procedure, equipment, support staff, and site/side marked as required: yes      Patient states understanding of procedure being performed: yes      Required blood products, implants, devices, and special equipment available: yes    Indication:     Cervical indication(s) comment:  Persistent itching    Vaginal indication(s) comment:  Persistent itching    Vulvar indication(s) comment:  Persistent itching    Other indication(s) comment:  Persistent itching  Pre-procedure:     Prep solution(s): acetic acid    Procedure:     Colposcopy with: vulvar biopsy      Biopsy taken: yes      # of biopsies:  2    Biopsy location(s): left labia    Colposcopy details:  Multiple faint circular 1mm, slightly raised AW lesions    Lesion visualized: fully visualized      Acetowhite lesion(s): vulva      Acetowhite lesion(s) description:  See above    Vulvar impression: low grade      Ferric subsulfate solution applied: no      Tampon inserted: no    Post-procedure:     Patient tolerance of procedure:  Patient tolerated the procedure well with no immediate complications    Estimated blood loss (mL):  5    Instructions and paperwork completed: yes      Educational handouts given: yes       Objective   Physical Exam  Genitourinary:         Comments: Multiple AW changes      A/P: chronic intermittent vulvar itching     -  Colpo     -  Bx x2 sent     -   Call for results     Vaginal atrophy    -  D/W the patient at length     -  RTC for follow up    -  D/W the patient EMBx if VB unrelated to vaginal trauma 2/2 atrophy (atrophy of uterus and endometrial stripe on recent US)

## 2025-04-28 ENCOUNTER — TELEPHONE (OUTPATIENT)
Dept: OBSTETRICS AND GYNECOLOGY | Facility: CLINIC | Age: 55
End: 2025-04-28
Payer: COMMERCIAL

## 2025-04-28 DIAGNOSIS — L29.2 VULVAR ITCHING: ICD-10-CM

## 2025-04-28 NOTE — TELEPHONE ENCOUNTER
Pt verified by name and .  Pt had vulvar bx done 2025.  Pt calling regarding number of biopsies.  Pt is aware there were 2 biopsies done.  Pt questions if she is to get premarin.  Pt is allergic ot propylene glycol.  Pt is aware nurse will discuss with Dr. Oglesby when she is back in the office Wednesday.  Pt is aware rx has been send to [facility] for clobetasol without propylene glycol.

## 2025-05-08 ENCOUNTER — PATIENT OUTREACH (OUTPATIENT)
Dept: CARE COORDINATION | Facility: CLINIC | Age: 55
End: 2025-05-08
Payer: COMMERCIAL

## 2025-05-08 LAB
LABORATORY COMMENT REPORT: NORMAL
PATH REPORT.COMMENTS IMP SPEC: NORMAL
PATH REPORT.FINAL DX SPEC: NORMAL
PATH REPORT.GROSS SPEC: NORMAL
PATH REPORT.RELEVANT HX SPEC: NORMAL
PATH REPORT.TOTAL CANCER: NORMAL

## 2025-05-08 PROCEDURE — 88312 SPECIAL STAINS GROUP 1: CPT

## 2025-05-20 ENCOUNTER — PATIENT OUTREACH (OUTPATIENT)
Dept: CARE COORDINATION | Facility: CLINIC | Age: 55
End: 2025-05-20
Payer: COMMERCIAL

## 2025-05-20 NOTE — PROGRESS NOTES
Final outreach attempt; RD left vm encouraging pt to return call if interested in scheduling a nutrition consult. Will close out of program and referral.

## 2025-06-13 ENCOUNTER — TELEPHONE (OUTPATIENT)
Dept: OBSTETRICS AND GYNECOLOGY | Facility: CLINIC | Age: 55
End: 2025-06-13
Payer: COMMERCIAL

## 2025-06-13 DIAGNOSIS — L29.2 VULVAR ITCHING: ICD-10-CM

## 2025-06-13 NOTE — TELEPHONE ENCOUNTER
Nurse called [facility]  Spoke to Luis.  Pt verified by name and .  In computer:  Pharmacy received 2025 at 10:13 Am.  Per Luis pharmacy did not receive order for clobetasol.  Nurse pend and send to Dr. Oglesby to send.

## 2025-06-16 ENCOUNTER — TELEPHONE (OUTPATIENT)
Dept: OBSTETRICS AND GYNECOLOGY | Facility: CLINIC | Age: 55
End: 2025-06-16
Payer: COMMERCIAL

## 2025-06-16 NOTE — TELEPHONE ENCOUNTER
Nurse called [facility].  Spoke to Luis.  Pt verified by name an .  He is aware per Ciera Sanchez the strength of clobetasol is 0.05% with 45 Gram tube and 11 refills.    Nurse will send message to pt that [facility] will be reaching out to her.

## 2025-06-23 ENCOUNTER — OFFICE VISIT (OUTPATIENT)
Dept: PRIMARY CARE | Facility: CLINIC | Age: 55
End: 2025-06-23
Payer: COMMERCIAL

## 2025-06-23 VITALS
DIASTOLIC BLOOD PRESSURE: 70 MMHG | HEART RATE: 61 BPM | TEMPERATURE: 97.8 F | BODY MASS INDEX: 30.52 KG/M2 | OXYGEN SATURATION: 97 % | SYSTOLIC BLOOD PRESSURE: 108 MMHG | HEIGHT: 65 IN | WEIGHT: 183.2 LBS

## 2025-06-23 DIAGNOSIS — J40 BRONCHITIS: ICD-10-CM

## 2025-06-23 DIAGNOSIS — N95.8 GENITOURINARY SYNDROME OF MENOPAUSE: ICD-10-CM

## 2025-06-23 DIAGNOSIS — F41.9 ANXIETY DISORDER, UNSPECIFIED TYPE: Primary | ICD-10-CM

## 2025-06-23 PROBLEM — N95.0 POSTMENOPAUSAL BLEEDING: Status: RESOLVED | Noted: 2023-04-06 | Resolved: 2025-06-23

## 2025-06-23 PROBLEM — B37.9 YEAST INFECTION: Status: RESOLVED | Noted: 2023-12-29 | Resolved: 2025-06-23

## 2025-06-23 PROCEDURE — 99214 OFFICE O/P EST MOD 30 MIN: CPT | Performed by: FAMILY MEDICINE

## 2025-06-23 PROCEDURE — 1036F TOBACCO NON-USER: CPT | Performed by: FAMILY MEDICINE

## 2025-06-23 PROCEDURE — 3008F BODY MASS INDEX DOCD: CPT | Performed by: FAMILY MEDICINE

## 2025-06-23 RX ORDER — ALBUTEROL SULFATE AND BUDESONIDE 90; 80 UG/1; UG/1
2 AEROSOL, METERED RESPIRATORY (INHALATION) EVERY 6 HOURS PRN
Qty: 10.7 G | Refills: 1 | Status: SHIPPED | OUTPATIENT
Start: 2025-06-23

## 2025-06-23 RX ORDER — ALBUTEROL SULFATE 90 UG/1
2 INHALANT RESPIRATORY (INHALATION) EVERY 4 HOURS PRN
Qty: 18 G | Refills: 1 | Status: SHIPPED | OUTPATIENT
Start: 2025-06-23

## 2025-06-23 RX ORDER — ALBUTEROL SULFATE 90 UG/1
2 INHALANT RESPIRATORY (INHALATION) EVERY 4 HOURS PRN
Qty: 18 G | Refills: 1 | OUTPATIENT
Start: 2025-06-23

## 2025-06-23 RX ORDER — CLOBETASOL PROPIONATE
POWDER (GRAM) MISCELLANEOUS
COMMUNITY
Start: 2025-06-16

## 2025-06-23 RX ORDER — ESTRADIOL 0.1 MG/G
CREAM VAGINAL
Qty: 42.5 G | Refills: 3 | Status: SHIPPED | OUTPATIENT
Start: 2025-06-23

## 2025-06-23 RX ORDER — METOCLOPRAMIDE 5 MG/1
5 TABLET ORAL 4 TIMES DAILY
COMMUNITY

## 2025-06-23 RX ORDER — DIAZEPAM 2 MG/1
2 TABLET ORAL DAILY PRN
Qty: 30 TABLET | Refills: 1 | Status: SHIPPED | OUTPATIENT
Start: 2025-06-23 | End: 2025-09-21

## 2025-06-23 RX ORDER — FLUTICASONE PROPIONATE 50 MCG
1 SPRAY, SUSPENSION (ML) NASAL DAILY
Qty: 16 G | Refills: 5 | Status: SHIPPED | OUTPATIENT
Start: 2025-06-23 | End: 2026-06-23

## 2025-06-23 ASSESSMENT — PAIN SCALES - GENERAL: PAINLEVEL_OUTOF10: 0-NO PAIN

## 2025-06-23 ASSESSMENT — LIFESTYLE VARIABLES: HOW MANY STANDARD DRINKS CONTAINING ALCOHOL DO YOU HAVE ON A TYPICAL DAY: PATIENT DOES NOT DRINK

## 2025-06-23 NOTE — TELEPHONE ENCOUNTER
[facility] [place] [phone] CALLING REGARDING ESTRACE RX THAT IS A COMMERCIAL AVAILABLE PRODUCT THAT WOULD NEED TO BE SENT TO [facility] OR [facility] THEY WOULD NEEDS DIFFERENT RX

## 2025-06-23 NOTE — TELEPHONE ENCOUNTER
Airsupra denied see below   Coverage is provided when the member meets the following criteria: 1. Member has a history of an inadequate clinical response of at least 14 days of TWO preferred medications in this UPDL category. Medications include but are not limite to: Asmanex Twisthaler, Flovent, Pulmicort Flexhaler, Arnuity Ellipta, Fluticasone propionate, Qvar, Albuterol Nebulizer Soln, Albuterol HFA, Proair HFA, Proventil HFA, Ventolin HFA, Dulera or Symbicort, and one of the trial medications MUST be either Dulera or Symbicort and documentation of medical necessity beyond convenience must be provided for why the member cannot be changed to the preferred drugs.

## 2025-06-23 NOTE — TELEPHONE ENCOUNTER
Let pt know to check if estrace at [facility] has the Propylene Glycol  since compounding pharmacy is declining to fill it.     Please call pharmacy to do the [facility]

## 2025-06-23 NOTE — PROGRESS NOTES
History Of Present Illness  Doris Zhao is a 55 y.o. female presenting for Follow-up (Med follow up)  .    HPI     Had vulvar skin biopsy, showing dermatitis. Was prescribe clobetasol compounded without propylene glycol, which she just received but hasn't tried yet.   Rx for estrogen not used because of allergy to propylene glycol.  Abdominal pain has improved.  She never used the gabapentin.  She is on Reglan twice a day per GI.    She still has some exercise-induced asthma, flared up since her URI several months ago.  Uses albuterol rarely.  Needs refill of Flonase for seasonal allergies.    Has anxiety with all her medical issues and stress at work.  Very rare use of lorazepam half tablet daily as needed.  OARRS was reviewed.  Consent for treatment up-to-date.    Past Medical History  Patient Active Problem List    Diagnosis Date Noted    Vulvar itching 2025    Vitamin D deficiency 2025    Atrial premature depolarization 2025    Obesity (BMI 30-39.9) 2025    Gastric polyp 2025    Lung nodule 2023    Allergic eczema 2023    Anxiety disorder 2023    Ascending aorta dilatation 2023    Chronic GERD 2023    Chronic vaginitis 2023    Lesion of vulva 2020    Lichen simplex chronicus 2019    Hyperlipidemia 2018    Dermatitis, unspecified 10/07/2017        Medications  Medications ordered prior to the current encounter[1]     Surgical History  She has a past surgical history that includes  section, classic (10/24/2016); Sinus surgery (10/24/2016);  section, low transverse; and [place] tooth extraction (N/A).     Social History  She reports that she has quit smoking. Her smoking use included cigarettes. She has never used smokeless tobacco. She reports that she does not currently use alcohol. She reports that she does not use drugs.    Family History  Family History[2]     Allergies  Codeine, Balsam peru, Ethylene,  "Glyceryl-t, Other, Potassium dichromate, Ethylene oxide (gas), Guaifenesin, and Propylene glycol    Immunizations  Immunization History   Administered Date(s) Administered    Tdap vaccine, age 7 year and older (BOOSTRIX, ADACEL) 12/24/2012, 08/28/2023        ROS  Negative, except as discussed in HPI     Vitals  /70   Pulse 61   Temp 36.6 °C (97.8 °F)   Ht 1.651 m (5' 5\")   Wt 83.1 kg (183 lb 3.2 oz)   SpO2 97%   BMI 30.49 kg/m²      Physical Exam  Vitals and nursing note reviewed.   Constitutional:       General: She is not in acute distress.     Appearance: Normal appearance.   Cardiovascular:      Rate and Rhythm: Normal rate and regular rhythm.      Heart sounds: Normal heart sounds.   Pulmonary:      Effort: No respiratory distress.      Breath sounds: Normal breath sounds.   Neurological:      General: No focal deficit present.      Mental Status: She is alert. Mental status is at baseline.   Psychiatric:         Mood and Affect: Mood normal.         Thought Content: Thought content normal.         Judgment: Judgment normal.         Relevant Results  Lab Results   Component Value Date    WBC 6.5 01/06/2025    WBC 6.9 06/25/2024    HGB 14.3 01/06/2025    HGB 13.8 06/25/2024    HCT 43.1 01/06/2025    HCT 42.4 06/25/2024    MCV 89 01/06/2025    MCV 90 06/25/2024     01/06/2025     06/25/2024     Lab Results   Component Value Date     01/06/2025     06/25/2024    K 4.1 01/06/2025    K 4.6 06/25/2024     01/06/2025     06/25/2024    CO2 31 01/06/2025    CO2 27 06/25/2024    BUN 18 01/06/2025    BUN 17 06/25/2024    CREATININE 0.95 01/06/2025    CREATININE 1.10 06/25/2024    CALCIUM 9.5 01/06/2025    CALCIUM 9.8 06/25/2024    PROT 7.4 01/06/2025    PROT 7.4 06/25/2024    BILITOT 0.6 01/06/2025    BILITOT 0.5 06/25/2024    ALKPHOS 81 01/06/2025    ALKPHOS 100 06/25/2024    ALT 14 01/06/2025    ALT 28 06/25/2024    AST 17 01/06/2025    AST 27 06/25/2024    GLUCOSE 85 " 01/06/2025    GLUCOSE 82 06/25/2024     Lab Results   Component Value Date    HGBA1C 5.1 01/06/2025     Lab Results   Component Value Date    TSH 2.37 01/06/2025      Lab Results   Component Value Date    CHOL 171 01/06/2025    TRIG 101 01/06/2025    HDL 57.0 01/06/2025           Assessment/Plan   Doris was seen today for follow-up.  Diagnoses and all orders for this visit:  Anxiety disorder, unspecified type (Primary)  Comments:  OARRS reviewed.  Consent for treatment with benzo discussed  Orders:  -     diazePAM (Valium) 2 mg tablet; Take 1 tablet (2 mg) by mouth once daily as needed for anxiety.  Genitourinary syndrome of menopause  -     estradiol (Estrace) 0.01 % (0.1 mg/gram) vaginal cream; At bedtime for 2 weeks, then at bedtime twice a week.  Bronchitis  -     albuterol 90 mcg/actuation inhaler; Inhale 2 puffs every 4 hours if needed for wheezing.  -     fluticasone (Flonase) 50 mcg/actuation nasal spray; Administer 1 spray into each nostril once daily. Shake gently. Before first use, prime pump. After use, clean tip and replace cap.  -     albuterol-budesonide (Airsupra) 90-80 mcg/actuation inhaler; Inhale 2 puffs every 6 hours if needed (dyspnea/wheezing).         Counseling:   Medication education:   -Education:  The patient is counseled regarding potential side-effects of any and all new medications  -Understanding:  Patient expressed understanding of information discussed today  -Adherence:  No barriers to adherence identified    Final treatment plan is a result of shared decision making with patient.         Jay Jay Berry MD          [1]   Current Outpatient Medications   Medication Sig Dispense Refill    clobetasol propionate, bulk, powder       metoclopramide (Reglan) 5 mg tablet Take 1 tablet (5 mg) by mouth 4 times a day.      pharmacy compounding accessory misc 1 Application 2 times a day. Pt to apply thin layer to vulva twice a day. 1 each 3    sucralfate (Carafate) 100 mg/mL suspension Take 10  mL (1 g) by mouth 2 times a day.      albuterol 90 mcg/actuation inhaler Inhale 2 puffs every 4 hours if needed for wheezing. 18 g 1    albuterol-budesonide (Airsupra) 90-80 mcg/actuation inhaler Inhale 2 puffs every 6 hours if needed (dyspnea/wheezing). 10.7 g 1    diazePAM (Valium) 2 mg tablet Take 1 tablet (2 mg) by mouth once daily as needed for anxiety. 30 tablet 1    estradiol (Estrace) 0.01 % (0.1 mg/gram) vaginal cream At bedtime for 2 weeks, then at bedtime twice a week. 42.5 g 3    fluticasone (Flonase) 50 mcg/actuation nasal spray Administer 1 spray into each nostril once daily. Shake gently. Before first use, prime pump. After use, clean tip and replace cap. 16 g 5     No current facility-administered medications for this visit.   [2]   Family History  Problem Relation Name Age of Onset    Stomach cancer Father's Sister      Breast cancer Cousin      Breast cancer Cousin      Stomach cancer Father's Sister mothers sister my aunt

## 2025-07-24 ENCOUNTER — TELEMEDICINE (OUTPATIENT)
Dept: PRIMARY CARE | Facility: CLINIC | Age: 55
End: 2025-07-24
Payer: COMMERCIAL

## 2025-07-24 DIAGNOSIS — J01.10 ACUTE NON-RECURRENT FRONTAL SINUSITIS: Primary | ICD-10-CM

## 2025-07-24 PROCEDURE — 99214 OFFICE O/P EST MOD 30 MIN: CPT | Performed by: NURSE PRACTITIONER

## 2025-07-24 RX ORDER — AZITHROMYCIN 250 MG/1
TABLET, FILM COATED ORAL
Qty: 6 TABLET | Refills: 0 | Status: SHIPPED | OUTPATIENT
Start: 2025-07-24 | End: 2025-07-29

## 2025-07-24 ASSESSMENT — PAIN SCALES - GENERAL: PAINLEVEL_OUTOF10: 8

## 2025-07-24 NOTE — PROGRESS NOTES
With patient's permission this is a telemedicine visit with video and audio.  History Of Present Illness  Doris Zhao is a 55 y.o. female who calls in for Sinusitis (DR VARGAS PT- PT STATES SHE HAS A SINUS INF AND THINKS IT IS TURNING INTO BRONCHITIS X 6 DAYS. GOING ON VACATION LEAVING ON SATURDAY ASKING IF SHE CAN GET A Z-PACK.).    HPI 55 year old female who has been sick for five days, she did test for covid yesterday and was negative, taking mucinex to feel better.  Going to [place] for vacation on Saturday,     Past Medical History  She has a past medical history of Allergic, Anxiety, Asthma, and Postmenopausal bleeding (2023).    She has no past medical history of Personal history of irradiation.    Medications  Current Outpatient Medications   Medication Instructions    albuterol 90 mcg/actuation inhaler 2 puffs, inhalation, Every 4 hours PRN    albuterol-budesonide (Airsupra) 90-80 mcg/actuation inhaler 2 puffs, inhalation, Every 6 hours PRN    azithromycin (Zithromax) 250 mg tablet Take 2 tablets (500 mg) by mouth once daily for 1 day, THEN 1 tablet (250 mg) once daily for 4 days. Take 2 tabs (500 mg) by mouth today, than 1 daily for 4 days.    clobetasol propionate, bulk, powder     diazePAM (VALIUM) 2 mg, oral, Daily PRN    estradiol (Estrace) 0.01 % (0.1 mg/gram) vaginal cream At bedtime for 2 weeks, then at bedtime twice a week.    fluticasone (Flonase) 50 mcg/actuation nasal spray 1 spray, Each Nostril, Daily, Shake gently. Before first use, prime pump. After use, clean tip and replace cap.    metoclopramide (REGLAN) 5 mg, 4 times daily    pharmacy compounding accessory misc 1 Application, miscellaneous, 2 times daily, Pt to apply thin layer to vulva twice a day.    sucralfate (CARAFATE) 1 g, 2 times daily        Surgical History  She has a past surgical history that includes  section, classic (10/24/2016); Sinus surgery (10/24/2016);  section, low transverse; and [place]  tooth extraction (N/A).     Social History  She reports that she has quit smoking. Her smoking use included cigarettes. She has never used smokeless tobacco. She reports that she does not currently use alcohol. She reports that she does not use drugs.    Family History  Family History[1]     Allergies  Codeine, Balsam peru, Ethylene, Glyceryl-t, Other, Potassium dichromate, Ethylene oxide (gas), Guaifenesin, and Propylene glycol    On video:     Appearance: Normal appearance.      Effort: Respiratory effort is normal. Speaking in full sentences. No respiratory distress.     Mood and Affect: Mood normal.         Thought Content: Thought content normal.         Judgment: Judgment normal.      Last Recorded Vitals  There were no vitals taken for this visit.  (Vitals are taken by patient at home, if any reported)    Relevant Results      Assessment/Plan   Doris was seen today for sinusitis.  Diagnoses and all orders for this visit:  Acute non-recurrent frontal sinusitis (Primary)  -     azithromycin (Zithromax) 250 mg tablet; Take 2 tablets (500 mg) by mouth once daily for 1 day, THEN 1 tablet (250 mg) once daily for 4 days. Take 2 tabs (500 mg) by mouth today, than 1 daily for 4 days.          Counseling    Medication education:              Education:  The patient is counseled regarding potential side-effects of any and all new medications             Understanding:  Patient expressed understanding             Adherence:  No barriers to adherence identified      Elida Camarillo, APRN-CNP          [1]   Family History  Problem Relation Name Age of Onset    Stomach cancer Father's Sister      Breast cancer Cousin      Breast cancer Cousin      Stomach cancer Father's Sister mothers sister my aunt